# Patient Record
Sex: FEMALE | Race: WHITE | NOT HISPANIC OR LATINO | Employment: UNEMPLOYED | URBAN - METROPOLITAN AREA
[De-identification: names, ages, dates, MRNs, and addresses within clinical notes are randomized per-mention and may not be internally consistent; named-entity substitution may affect disease eponyms.]

---

## 2018-03-07 ENCOUNTER — OFFICE VISIT (OUTPATIENT)
Dept: SURGERY | Facility: CLINIC | Age: 53
End: 2018-03-07
Payer: COMMERCIAL

## 2018-03-07 ENCOUNTER — HOSPITAL ENCOUNTER (OUTPATIENT)
Dept: RADIOLOGY | Facility: HOSPITAL | Age: 53
Discharge: HOME | End: 2018-03-07
Attending: SURGERY
Payer: COMMERCIAL

## 2018-03-07 VITALS
DIASTOLIC BLOOD PRESSURE: 65 MMHG | HEART RATE: 77 BPM | WEIGHT: 103.81 LBS | SYSTOLIC BLOOD PRESSURE: 141 MMHG | TEMPERATURE: 98 F

## 2018-03-07 DIAGNOSIS — K42.9 UMBILICAL HERNIA WITHOUT OBSTRUCTION AND WITHOUT GANGRENE: Primary | ICD-10-CM

## 2018-03-07 DIAGNOSIS — K42.9 UMBILICAL HERNIA WITHOUT OBSTRUCTION AND WITHOUT GANGRENE: ICD-10-CM

## 2018-03-07 PROCEDURE — 74177 CT ABD & PELVIS W/CONTRAST: CPT | Mod: TC

## 2018-03-07 PROCEDURE — 74177 CT ABD & PELVIS W/CONTRAST: CPT | Mod: 26,,, | Performed by: RADIOLOGY

## 2018-03-07 PROCEDURE — 25500020 PHARM REV CODE 255

## 2018-03-07 PROCEDURE — 99204 OFFICE O/P NEW MOD 45 MIN: CPT | Mod: S$GLB,,, | Performed by: SURGERY

## 2018-03-07 PROCEDURE — 99999 PR PBB SHADOW E&M-NEW PATIENT-LVL IV: CPT | Mod: PBBFAC,,, | Performed by: SURGERY

## 2018-03-07 RX ORDER — SUMATRIPTAN SUCCINATE 100 MG/1
TABLET ORAL
Refills: 7 | COMMUNITY
Start: 2018-02-12

## 2018-03-07 RX ORDER — SODIUM CHLORIDE 9 MG/ML
INJECTION, SOLUTION INTRAVENOUS
Status: DISPENSED
Start: 2018-03-07 | End: 2018-03-07

## 2018-03-07 RX ORDER — NORETHINDRONE ACETATE AND ETHINYL ESTRADIOL 1MG-20(24)
KIT ORAL
Refills: 11 | COMMUNITY
Start: 2018-02-11 | End: 2019-03-21 | Stop reason: ALTCHOICE

## 2018-03-07 RX ORDER — SODIUM CHLORIDE 9 MG/ML
INJECTION, SOLUTION INTRAVENOUS CONTINUOUS
Status: CANCELLED | OUTPATIENT
Start: 2018-03-07

## 2018-03-07 RX ADMIN — IOHEXOL 30 ML: 350 INJECTION, SOLUTION INTRAVENOUS at 11:03

## 2018-03-07 RX ADMIN — IOHEXOL 75 ML: 350 INJECTION, SOLUTION INTRAVENOUS at 11:03

## 2018-03-09 ENCOUNTER — PATIENT MESSAGE (OUTPATIENT)
Dept: SURGERY | Facility: HOSPITAL | Age: 53
End: 2018-03-09

## 2018-03-09 NOTE — PROGRESS NOTES
Subjective:       Patient ID: Angel Penaloza is a 52 y.o. female.    Chief Complaint: Consult (possible hernia)      HPI 53 yo female with complaint of pain at level of umbilicus. She can feel a mass there. First noted about a week ago. Says it might feel better following BMs. She denies any significant GI problems. Last colonoscopy revealed a benign polyp. Denies N/V. Denies fever or chills. She cannot reduce the bulge completely. Her father is a retired general surgeon and is concerned about possible other intra abdominal pathology.    History reviewed. No pertinent past medical history.  History reviewed. No pertinent surgical history.      Current Outpatient Prescriptions:     sumatriptan (IMITREX) 100 MG tablet, TK 1 T PO AT HA ONSET. NO MORE THAN 2 TS PER DAY, Disp: , Rfl: 7    BLISOVI 24 FE 1 mg-20 mcg (24)/75 mg (4) per tablet, TK 1 T PO ONCE PER DAY, Disp: , Rfl: 11    Review of patient's allergies indicates:   Allergen Reactions    Penicillins Rash     augementin         History reviewed. No pertinent family history.  Social History     Social History    Marital status:      Spouse name: N/A    Number of children: N/A    Years of education: N/A     Occupational History    Not on file.     Social History Main Topics    Smoking status: Never Smoker    Smokeless tobacco: Never Used    Alcohol use No    Drug use: No    Sexual activity: Yes     Other Topics Concern    Not on file     Social History Narrative    No narrative on file       Review of Systems   Constitutional: Negative for activity change, chills, fever and unexpected weight change.   HENT: Negative for congestion, sore throat, trouble swallowing and voice change.    Eyes: Negative for redness and visual disturbance.   Respiratory: Negative for cough, shortness of breath and wheezing.    Cardiovascular: Negative for chest pain and palpitations.   Gastrointestinal: Positive for abdominal pain. Negative for blood in stool,  nausea and vomiting.   Endocrine: Negative.    Genitourinary: Negative for dysuria, frequency and hematuria.   Musculoskeletal: Negative for arthralgias, back pain and neck pain.   Skin: Negative for rash and wound.   Allergic/Immunologic: Negative.    Neurological: Negative for dizziness, weakness and headaches.   Hematological: Negative for adenopathy.   Psychiatric/Behavioral: Negative for agitation and dysphoric mood. The patient is not nervous/anxious.      Objective:     Physical Exam   Constitutional: She is oriented to person, place, and time. She appears well-developed and well-nourished. No distress.   HENT:   Head: Normocephalic and atraumatic.   Mouth/Throat: Oropharynx is clear and moist. No oropharyngeal exudate.   Eyes: Conjunctivae and EOM are normal. Pupils are equal, round, and reactive to light. No scleral icterus.   Neck: Normal range of motion. No thyromegaly present.   Cardiovascular: Normal rate and regular rhythm.    No murmur heard.  Pulmonary/Chest: Effort normal and breath sounds normal. She has no wheezes. She has no rales.   Abdominal: Soft. Bowel sounds are normal. She exhibits no distension and no mass. There is tenderness. There is no rebound and no guarding. A hernia is present.   Partially reducible fat containing umbilical hernia. No surrounding abdominal tenderness. No rebound or guarding.   Musculoskeletal: Normal range of motion. She exhibits no edema.   Lymphadenopathy:     She has no cervical adenopathy.   Neurological: She is alert and oriented to person, place, and time. No cranial nerve deficit.   Skin: Skin is warm and dry. No rash noted. No erythema.   Psychiatric: She has a normal mood and affect. Her behavior is normal.     Assessment:     Encounter Diagnosis   Name Primary?    Umbilical hernia without obstruction and without gangrene Yes       Plan:      1. CT scan to evaluate for other intra abdominal pathology.  2. Plan umbilical hernia repair.  3. Risks and  benefits of the planned procedure were discussed at length with the patient.  Risks and benefits of not proceeding with the procedure were discussed as well. All questions were answered. The patient expressed clear understanding and would like to proceed with the procedure as discussed.

## 2018-03-13 ENCOUNTER — PATIENT MESSAGE (OUTPATIENT)
Dept: SURGERY | Facility: HOSPITAL | Age: 53
End: 2018-03-13

## 2018-03-14 ENCOUNTER — HOSPITAL ENCOUNTER (OUTPATIENT)
Dept: PREADMISSION TESTING | Facility: HOSPITAL | Age: 53
Discharge: HOME | End: 2018-03-14
Attending: SURGERY
Payer: COMMERCIAL

## 2018-03-14 VITALS — BODY MASS INDEX: 18.25 KG/M2 | HEIGHT: 63 IN | WEIGHT: 103 LBS

## 2018-03-14 DIAGNOSIS — Z01.818 PRE-OP EXAM: ICD-10-CM

## 2018-03-14 DIAGNOSIS — K42.9 UMBILICAL HERNIA WITHOUT OBSTRUCTION AND WITHOUT GANGRENE: Primary | ICD-10-CM

## 2018-03-14 LAB
ALBUMIN SERPL BCP-MCNC: 3.8 G/DL
ALP SERPL-CCNC: 40 U/L
ALT SERPL W/O P-5'-P-CCNC: 16 U/L
ANION GAP SERPL CALC-SCNC: 10 MMOL/L
AST SERPL-CCNC: 18 U/L
BASOPHILS # BLD AUTO: 0 K/UL
BASOPHILS NFR BLD: 0.5 %
BILIRUB SERPL-MCNC: 0.4 MG/DL
BUN SERPL-MCNC: 24 MG/DL
CALCIUM SERPL-MCNC: 9.9 MG/DL
CHLORIDE SERPL-SCNC: 104 MMOL/L
CO2 SERPL-SCNC: 25 MMOL/L
CREAT SERPL-MCNC: 0.9 MG/DL
DIFFERENTIAL METHOD: ABNORMAL
EOSINOPHIL # BLD AUTO: 0.1 K/UL
EOSINOPHIL NFR BLD: 2 %
ERYTHROCYTE [DISTWIDTH] IN BLOOD BY AUTOMATED COUNT: 12.2 %
EST. GFR  (AFRICAN AMERICAN): >60 ML/MIN/1.73 M^2
EST. GFR  (NON AFRICAN AMERICAN): >60 ML/MIN/1.73 M^2
GLUCOSE SERPL-MCNC: 115 MG/DL
HCT VFR BLD AUTO: 38.6 %
HGB BLD-MCNC: 13 G/DL
LYMPHOCYTES # BLD AUTO: 2.3 K/UL
LYMPHOCYTES NFR BLD: 32.8 %
MCH RBC QN AUTO: 32.9 PG
MCHC RBC AUTO-ENTMCNC: 33.7 G/DL
MCV RBC AUTO: 98 FL
MONOCYTES # BLD AUTO: 0.5 K/UL
MONOCYTES NFR BLD: 7 %
NEUTROPHILS # BLD AUTO: 4.1 K/UL
NEUTROPHILS NFR BLD: 57.7 %
PLATELET # BLD AUTO: 245 K/UL
PMV BLD AUTO: 8.5 FL
POTASSIUM SERPL-SCNC: 4 MMOL/L
PROT SERPL-MCNC: 7.3 G/DL
RBC # BLD AUTO: 3.95 M/UL
SODIUM SERPL-SCNC: 139 MMOL/L
WBC # BLD AUTO: 7.1 K/UL

## 2018-03-14 PROCEDURE — 80053 COMPREHEN METABOLIC PANEL: CPT

## 2018-03-14 PROCEDURE — 85025 COMPLETE CBC W/AUTO DIFF WBC: CPT

## 2018-03-14 PROCEDURE — 36415 COLL VENOUS BLD VENIPUNCTURE: CPT

## 2018-03-14 PROCEDURE — 93010 ELECTROCARDIOGRAM REPORT: CPT | Mod: ,,, | Performed by: INTERNAL MEDICINE

## 2018-03-14 PROCEDURE — 93005 ELECTROCARDIOGRAM TRACING: CPT

## 2018-03-14 PROCEDURE — 99900103 DSU ONLY-NO CHARGE-INITIAL HR (STAT)

## 2018-03-14 PROCEDURE — 99900104 DSU ONLY-NO CHARGE-EA ADD'L HR (STAT)

## 2018-03-15 ENCOUNTER — ANESTHESIA EVENT (OUTPATIENT)
Dept: SURGERY | Facility: HOSPITAL | Age: 53
End: 2018-03-15
Payer: COMMERCIAL

## 2018-03-16 ENCOUNTER — ANESTHESIA (OUTPATIENT)
Dept: SURGERY | Facility: HOSPITAL | Age: 53
End: 2018-03-16
Payer: COMMERCIAL

## 2018-03-16 ENCOUNTER — SURGERY (OUTPATIENT)
Age: 53
End: 2018-03-16

## 2018-03-16 ENCOUNTER — HOSPITAL ENCOUNTER (OUTPATIENT)
Facility: HOSPITAL | Age: 53
Discharge: HOME | End: 2018-03-16
Attending: SURGERY | Admitting: SURGERY
Payer: COMMERCIAL

## 2018-03-16 VITALS
WEIGHT: 105 LBS | BODY MASS INDEX: 19.32 KG/M2 | TEMPERATURE: 97 F | RESPIRATION RATE: 16 BRPM | HEART RATE: 64 BPM | OXYGEN SATURATION: 100 % | SYSTOLIC BLOOD PRESSURE: 123 MMHG | HEIGHT: 62 IN | DIASTOLIC BLOOD PRESSURE: 66 MMHG

## 2018-03-16 DIAGNOSIS — K42.9 UMBILICAL HERNIA WITHOUT OBSTRUCTION AND WITHOUT GANGRENE: Primary | ICD-10-CM

## 2018-03-16 LAB
B-HCG UR QL: NEGATIVE
CTP QC/QA: YES

## 2018-03-16 PROCEDURE — 63600175 PHARM REV CODE 636 W HCPCS: Performed by: NURSE ANESTHETIST, CERTIFIED REGISTERED

## 2018-03-16 PROCEDURE — 99900104 DSU ONLY-NO CHARGE-EA ADD'L HR (STAT): Performed by: SURGERY

## 2018-03-16 PROCEDURE — 25000003 PHARM REV CODE 250: Performed by: NURSE ANESTHETIST, CERTIFIED REGISTERED

## 2018-03-16 PROCEDURE — 81025 URINE PREGNANCY TEST: CPT | Performed by: ANESTHESIOLOGY

## 2018-03-16 PROCEDURE — 99900103 DSU ONLY-NO CHARGE-INITIAL HR (STAT): Performed by: SURGERY

## 2018-03-16 PROCEDURE — 71000033 HC RECOVERY, INTIAL HOUR: Performed by: SURGERY

## 2018-03-16 PROCEDURE — 36000706: Performed by: SURGERY

## 2018-03-16 PROCEDURE — D9220A PRA ANESTHESIA: Mod: ANES,,, | Performed by: ANESTHESIOLOGY

## 2018-03-16 PROCEDURE — 36000707: Performed by: SURGERY

## 2018-03-16 PROCEDURE — 25000003 PHARM REV CODE 250: Performed by: ANESTHESIOLOGY

## 2018-03-16 PROCEDURE — 37000009 HC ANESTHESIA EA ADD 15 MINS: Performed by: SURGERY

## 2018-03-16 PROCEDURE — S0077 INJECTION, CLINDAMYCIN PHOSP: HCPCS | Performed by: SURGERY

## 2018-03-16 PROCEDURE — 88305 TISSUE EXAM BY PATHOLOGIST: CPT | Performed by: PATHOLOGY

## 2018-03-16 PROCEDURE — 71000015 HC POSTOP RECOV 1ST HR: Performed by: SURGERY

## 2018-03-16 PROCEDURE — 37000008 HC ANESTHESIA 1ST 15 MINUTES: Performed by: SURGERY

## 2018-03-16 PROCEDURE — 25000003 PHARM REV CODE 250: Performed by: SURGERY

## 2018-03-16 PROCEDURE — 49585 PR REPAIR UMBILICAL HERN,5+Y/O,REDUC: CPT | Mod: ,,, | Performed by: SURGERY

## 2018-03-16 PROCEDURE — 71000016 HC POSTOP RECOV ADDL HR: Performed by: SURGERY

## 2018-03-16 PROCEDURE — D9220A PRA ANESTHESIA: Mod: CRNA,,, | Performed by: NURSE ANESTHETIST, CERTIFIED REGISTERED

## 2018-03-16 RX ORDER — CLINDAMYCIN PHOSPHATE 900 MG/50ML
900 INJECTION, SOLUTION INTRAVENOUS
Status: COMPLETED | OUTPATIENT
Start: 2018-03-16 | End: 2018-03-16

## 2018-03-16 RX ORDER — ROCURONIUM BROMIDE 10 MG/ML
INJECTION, SOLUTION INTRAVENOUS
Status: DISCONTINUED | OUTPATIENT
Start: 2018-03-16 | End: 2018-03-16

## 2018-03-16 RX ORDER — FENTANYL CITRATE 50 UG/ML
25 INJECTION, SOLUTION INTRAMUSCULAR; INTRAVENOUS EVERY 5 MIN PRN
Status: DISCONTINUED | OUTPATIENT
Start: 2018-03-16 | End: 2018-03-16 | Stop reason: HOSPADM

## 2018-03-16 RX ORDER — DEXAMETHASONE SODIUM PHOSPHATE 4 MG/ML
INJECTION, SOLUTION INTRA-ARTICULAR; INTRALESIONAL; INTRAMUSCULAR; INTRAVENOUS; SOFT TISSUE
Status: DISCONTINUED | OUTPATIENT
Start: 2018-03-16 | End: 2018-03-16

## 2018-03-16 RX ORDER — OXYCODONE HYDROCHLORIDE 5 MG/1
5 TABLET ORAL
Status: DISCONTINUED | OUTPATIENT
Start: 2018-03-16 | End: 2018-03-16 | Stop reason: HOSPADM

## 2018-03-16 RX ORDER — MIDAZOLAM HYDROCHLORIDE 1 MG/ML
INJECTION, SOLUTION INTRAMUSCULAR; INTRAVENOUS
Status: DISCONTINUED | OUTPATIENT
Start: 2018-03-16 | End: 2018-03-16

## 2018-03-16 RX ORDER — ONDANSETRON 2 MG/ML
INJECTION INTRAMUSCULAR; INTRAVENOUS
Status: DISCONTINUED | OUTPATIENT
Start: 2018-03-16 | End: 2018-03-16

## 2018-03-16 RX ORDER — LIDOCAINE HYDROCHLORIDE 10 MG/ML
5 INJECTION, SOLUTION EPIDURAL; INFILTRATION; INTRACAUDAL; PERINEURAL ONCE
Status: COMPLETED | OUTPATIENT
Start: 2018-03-16 | End: 2018-03-16

## 2018-03-16 RX ORDER — ONDANSETRON 2 MG/ML
4 INJECTION INTRAMUSCULAR; INTRAVENOUS ONCE AS NEEDED
Status: DISCONTINUED | OUTPATIENT
Start: 2018-03-16 | End: 2018-03-16 | Stop reason: HOSPADM

## 2018-03-16 RX ORDER — LIDOCAINE HCL/PF 100 MG/5ML
SYRINGE (ML) INTRAVENOUS
Status: DISCONTINUED | OUTPATIENT
Start: 2018-03-16 | End: 2018-03-16

## 2018-03-16 RX ORDER — BUPIVACAINE HYDROCHLORIDE AND EPINEPHRINE 5; 5 MG/ML; UG/ML
INJECTION, SOLUTION EPIDURAL; INTRACAUDAL; PERINEURAL
Status: DISCONTINUED | OUTPATIENT
Start: 2018-03-16 | End: 2018-03-16 | Stop reason: HOSPADM

## 2018-03-16 RX ORDER — OXYCODONE HYDROCHLORIDE 5 MG/1
5 TABLET ORAL ONCE AS NEEDED
Status: COMPLETED | OUTPATIENT
Start: 2018-03-16 | End: 2018-03-16

## 2018-03-16 RX ORDER — FENTANYL CITRATE 50 UG/ML
INJECTION, SOLUTION INTRAMUSCULAR; INTRAVENOUS
Status: DISCONTINUED | OUTPATIENT
Start: 2018-03-16 | End: 2018-03-16

## 2018-03-16 RX ORDER — KETOROLAC TROMETHAMINE 30 MG/ML
INJECTION, SOLUTION INTRAMUSCULAR; INTRAVENOUS
Status: DISCONTINUED | OUTPATIENT
Start: 2018-03-16 | End: 2018-03-16

## 2018-03-16 RX ORDER — HYDROCODONE BITARTRATE AND ACETAMINOPHEN 5; 325 MG/1; MG/1
1 TABLET ORAL EVERY 4 HOURS PRN
Status: DISCONTINUED | OUTPATIENT
Start: 2018-03-16 | End: 2018-03-16 | Stop reason: HOSPADM

## 2018-03-16 RX ORDER — HYDROCODONE BITARTRATE AND ACETAMINOPHEN 7.5; 325 MG/1; MG/1
1 TABLET ORAL EVERY 4 HOURS PRN
Qty: 25 TABLET | Refills: 0 | Status: SHIPPED | OUTPATIENT
Start: 2018-03-16 | End: 2018-03-26

## 2018-03-16 RX ORDER — ACETAMINOPHEN 10 MG/ML
INJECTION, SOLUTION INTRAVENOUS
Status: DISCONTINUED | OUTPATIENT
Start: 2018-03-16 | End: 2018-03-16

## 2018-03-16 RX ORDER — SODIUM CHLORIDE 0.9 % (FLUSH) 0.9 %
3 SYRINGE (ML) INJECTION
Status: DISCONTINUED | OUTPATIENT
Start: 2018-03-16 | End: 2018-03-16 | Stop reason: HOSPADM

## 2018-03-16 RX ORDER — NEOSTIGMINE METHYLSULFATE 1 MG/ML
INJECTION, SOLUTION INTRAVENOUS
Status: DISCONTINUED | OUTPATIENT
Start: 2018-03-16 | End: 2018-03-16

## 2018-03-16 RX ORDER — SODIUM CHLORIDE 9 MG/ML
INJECTION, SOLUTION INTRAVENOUS CONTINUOUS
Status: DISCONTINUED | OUTPATIENT
Start: 2018-03-16 | End: 2018-03-16 | Stop reason: HOSPADM

## 2018-03-16 RX ORDER — METOCLOPRAMIDE HYDROCHLORIDE 5 MG/ML
10 INJECTION INTRAMUSCULAR; INTRAVENOUS EVERY 10 MIN PRN
Status: DISCONTINUED | OUTPATIENT
Start: 2018-03-16 | End: 2018-03-16 | Stop reason: HOSPADM

## 2018-03-16 RX ORDER — GLYCOPYRROLATE 0.2 MG/ML
INJECTION INTRAMUSCULAR; INTRAVENOUS
Status: DISCONTINUED | OUTPATIENT
Start: 2018-03-16 | End: 2018-03-16

## 2018-03-16 RX ORDER — PROPOFOL 10 MG/ML
VIAL (ML) INTRAVENOUS
Status: DISCONTINUED | OUTPATIENT
Start: 2018-03-16 | End: 2018-03-16

## 2018-03-16 RX ADMIN — LIDOCAINE HYDROCHLORIDE 75 MG: 20 INJECTION, SOLUTION INTRAVENOUS at 12:03

## 2018-03-16 RX ADMIN — ROCURONIUM BROMIDE 30 MG: 10 INJECTION, SOLUTION INTRAVENOUS at 12:03

## 2018-03-16 RX ADMIN — OXYCODONE HYDROCHLORIDE 5 MG: 5 TABLET ORAL at 01:03

## 2018-03-16 RX ADMIN — ACETAMINOPHEN 1000 MG: 10 INJECTION, SOLUTION INTRAVENOUS at 12:03

## 2018-03-16 RX ADMIN — DEXAMETHASONE SODIUM PHOSPHATE 8 MG: 4 INJECTION, SOLUTION INTRAMUSCULAR; INTRAVENOUS at 12:03

## 2018-03-16 RX ADMIN — CLINDAMYCIN PHOSPHATE 900 MG: 18 INJECTION, SOLUTION INTRAVENOUS at 12:03

## 2018-03-16 RX ADMIN — FENTANYL CITRATE 50 MCG: 50 INJECTION, SOLUTION INTRAMUSCULAR; INTRAVENOUS at 12:03

## 2018-03-16 RX ADMIN — GLYCOPYRROLATE 0.4 MG: 0.2 INJECTION, SOLUTION INTRAMUSCULAR; INTRAVENOUS at 12:03

## 2018-03-16 RX ADMIN — PROPOFOL 160 MG: 10 INJECTION, EMULSION INTRAVENOUS at 12:03

## 2018-03-16 RX ADMIN — BUPIVACAINE HYDROCHLORIDE AND EPINEPHRINE BITARTRATE 30 ML: 5; .0091 INJECTION, SOLUTION EPIDURAL; INTRACAUDAL; PERINEURAL at 12:03

## 2018-03-16 RX ADMIN — KETOROLAC TROMETHAMINE 30 MG: 30 INJECTION, SOLUTION INTRAMUSCULAR; INTRAVENOUS at 12:03

## 2018-03-16 RX ADMIN — GLYCOPYRROLATE 0.2 MG: 0.2 INJECTION, SOLUTION INTRAMUSCULAR; INTRAVENOUS at 12:03

## 2018-03-16 RX ADMIN — LIDOCAINE HYDROCHLORIDE 5 MG: 10 INJECTION, SOLUTION EPIDURAL; INFILTRATION; INTRACAUDAL; PERINEURAL at 10:03

## 2018-03-16 RX ADMIN — SODIUM CHLORIDE, SODIUM GLUCONATE, SODIUM ACETATE, POTASSIUM CHLORIDE, MAGNESIUM CHLORIDE, SODIUM PHOSPHATE, DIBASIC, AND POTASSIUM PHOSPHATE: .53; .5; .37; .037; .03; .012; .00082 INJECTION, SOLUTION INTRAVENOUS at 10:03

## 2018-03-16 RX ADMIN — NEOSTIGMINE METHYLSULFATE 5 MG: 1 INJECTION INTRAVENOUS at 12:03

## 2018-03-16 RX ADMIN — ONDANSETRON 4 MG: 2 INJECTION, SOLUTION INTRAMUSCULAR; INTRAVENOUS at 12:03

## 2018-03-16 RX ADMIN — MIDAZOLAM 2 MG: 1 INJECTION INTRAMUSCULAR; INTRAVENOUS at 12:03

## 2018-03-16 NOTE — H&P (VIEW-ONLY)
Subjective:       Patient ID: Angel Penaloza is a 52 y.o. female.    Chief Complaint: Consult (possible hernia)      HPI 51 yo female with complaint of pain at level of umbilicus. She can feel a mass there. First noted about a week ago. Says it might feel better following BMs. She denies any significant GI problems. Last colonoscopy revealed a benign polyp. Denies N/V. Denies fever or chills. She cannot reduce the bulge completely. Her father is a retired general surgeon and is concerned about possible other intra abdominal pathology.    History reviewed. No pertinent past medical history.  History reviewed. No pertinent surgical history.      Current Outpatient Prescriptions:     sumatriptan (IMITREX) 100 MG tablet, TK 1 T PO AT HA ONSET. NO MORE THAN 2 TS PER DAY, Disp: , Rfl: 7    BLISOVI 24 FE 1 mg-20 mcg (24)/75 mg (4) per tablet, TK 1 T PO ONCE PER DAY, Disp: , Rfl: 11    Review of patient's allergies indicates:   Allergen Reactions    Penicillins Rash     augementin         History reviewed. No pertinent family history.  Social History     Social History    Marital status:      Spouse name: N/A    Number of children: N/A    Years of education: N/A     Occupational History    Not on file.     Social History Main Topics    Smoking status: Never Smoker    Smokeless tobacco: Never Used    Alcohol use No    Drug use: No    Sexual activity: Yes     Other Topics Concern    Not on file     Social History Narrative    No narrative on file       Review of Systems   Constitutional: Negative for activity change, chills, fever and unexpected weight change.   HENT: Negative for congestion, sore throat, trouble swallowing and voice change.    Eyes: Negative for redness and visual disturbance.   Respiratory: Negative for cough, shortness of breath and wheezing.    Cardiovascular: Negative for chest pain and palpitations.   Gastrointestinal: Positive for abdominal pain. Negative for blood in stool,  nausea and vomiting.   Endocrine: Negative.    Genitourinary: Negative for dysuria, frequency and hematuria.   Musculoskeletal: Negative for arthralgias, back pain and neck pain.   Skin: Negative for rash and wound.   Allergic/Immunologic: Negative.    Neurological: Negative for dizziness, weakness and headaches.   Hematological: Negative for adenopathy.   Psychiatric/Behavioral: Negative for agitation and dysphoric mood. The patient is not nervous/anxious.      Objective:     Physical Exam   Constitutional: She is oriented to person, place, and time. She appears well-developed and well-nourished. No distress.   HENT:   Head: Normocephalic and atraumatic.   Mouth/Throat: Oropharynx is clear and moist. No oropharyngeal exudate.   Eyes: Conjunctivae and EOM are normal. Pupils are equal, round, and reactive to light. No scleral icterus.   Neck: Normal range of motion. No thyromegaly present.   Cardiovascular: Normal rate and regular rhythm.    No murmur heard.  Pulmonary/Chest: Effort normal and breath sounds normal. She has no wheezes. She has no rales.   Abdominal: Soft. Bowel sounds are normal. She exhibits no distension and no mass. There is tenderness. There is no rebound and no guarding. A hernia is present.   Partially reducible fat containing umbilical hernia. No surrounding abdominal tenderness. No rebound or guarding.   Musculoskeletal: Normal range of motion. She exhibits no edema.   Lymphadenopathy:     She has no cervical adenopathy.   Neurological: She is alert and oriented to person, place, and time. No cranial nerve deficit.   Skin: Skin is warm and dry. No rash noted. No erythema.   Psychiatric: She has a normal mood and affect. Her behavior is normal.     Assessment:     Encounter Diagnosis   Name Primary?    Umbilical hernia without obstruction and without gangrene Yes       Plan:      1. CT scan to evaluate for other intra abdominal pathology.  2. Plan umbilical hernia repair.  3. Risks and  benefits of the planned procedure were discussed at length with the patient.  Risks and benefits of not proceeding with the procedure were discussed as well. All questions were answered. The patient expressed clear understanding and would like to proceed with the procedure as discussed.

## 2018-03-16 NOTE — PLAN OF CARE
Pt rates pain to abdomen = 4 on scale to 1 to 10.  UPT = Negative.  VSS.  Updated Dr. Red on patient's post op N/V.

## 2018-03-16 NOTE — OP NOTE
Patient: Angel Penaloza     Date of Procedure: 3/16/2018    Procedure: Umbilical hernia repair without mesh implantation    Surgeon: Patrick Chapman MD    Assistant: Janina Putnam    Pre-op Diagnosis: Umbilical hernia without obstruction and without gangrene [K42.9]     Post-op Diagnosis: Umbilical hernia without obstruction and without gangrene [K42.9]    Findings: Umbilical hernia    Specimen: incarcerated fat    EBL: 10 cc    Complications: None      Procedure in detail:      After appropriate consent was obtained, consent forms signed and questions answered, the operative site was marked and the patient was taken to the operating room.  The patient was positioned and general anesthesia was induced. Pre-operative antibiotic was administered. Time out procedure was then performed with the members of the surgical team. The operative field was then prepped and draped in the usual sterile fashion. An ioban was placed.     An infra-umbilical skin incision was made and dissection was performed down to the fascial defect dissecting the umbilical skin off of the hernia sac. The fascial edges were cleared. A tiny amount of incarcerated fat was excised.    The defect was approximately 1 cm in diamter. The fascia was then reapproximated with 0 ethibond suture in an inturrupted fashion. There was minimal tension.      The subcutaneous tissues were then closed with a running 3-0 Vicryl suture tacking the umbilical skin down to the suture line. The skin was then closed with a running 4-0 Monocryl suture.     Steri-Strips and dressings were  applied.  The patient was then awakened, extubated, and transferred to the recovery room in stable condition.  The procedure was tolerated without complication.

## 2018-03-16 NOTE — ANESTHESIA POSTPROCEDURE EVALUATION
"Anesthesia Post Evaluation    Patient: Angel Penaloza    Procedure(s) Performed: Procedure(s) (LRB):  REPAIR-HERNIA-UMBILICAL (5 YRS +) (N/A)    Final Anesthesia Type: general  Patient location during evaluation: PACU  Patient participation: Yes- Able to Participate  Level of consciousness: awake and alert  Post-procedure vital signs: reviewed and stable  Pain management: adequate  Airway patency: patent  PONV status at discharge: No PONV  Anesthetic complications: no      Cardiovascular status: blood pressure returned to baseline  Respiratory status: unassisted  Hydration status: euvolemic  Follow-up not needed.        Visit Vitals  /68   Pulse 62   Temp 36.3 °C (97.3 °F) (Temporal)   Resp 16   Ht 5' 2" (1.575 m)   Wt 47.6 kg (105 lb)   LMP 02/14/2018   SpO2 100%   Breastfeeding? No   BMI 19.20 kg/m²       Pain/Nikhil Score: Pain Assessment Performed: Yes (3/16/2018  1:56 PM)  Presence of Pain: complains of pain/discomfort (3/16/2018  1:56 PM)  Pain Rating Prior to Med Admin: 3 (3/16/2018  1:24 PM)  Nikhil Score: 10 (3/16/2018  1:56 PM)      "

## 2018-03-16 NOTE — DISCHARGE INSTRUCTIONS
General Information:    1.  Do not drink alcoholic beverages including beer for 24 hours or as long as you are on pain medication..  2.  Do not drive a motor vehicle, operate machinery or power tools, or signs legal papers for 24 hours or as long as you are on pain medication.   3.  You may experience light-headedness, dizziness, and sleepiness following surgery. Please do not stay alone. A responsible adult should be with you for this 24 hour period.  4.  Go home and rest.    5. Progress slowly to a normal diet unless instructed.  Otherwise, begin with liquids such as soft drinks, then soup and crackers working up to solid foods. Drink plenty of nonalcoholic fluids.  6.  Certain anesthetics and pain medications produce nausea and vomiting in certain       individuals. If nausea becomes a problem at home, call you doctor.    7. A nurse will be calling you sometime after surgery. Do not be alarmed. This is our way of finding out how you are doing.    8. Several times every hour while you are awake, take 2-3 deep breaths and cough. If you had stomach surgery hold a pillow or rolled towel firmly against your stomach before you cough. This will help with any pain the cough might cause.  9. Several times every hour while you are awake, pump and flex your feet 5-6 times and do foot circles. This will help prevent blood clots.    10.Call your doctor for severe pain, bleeding, fever, or signs or symptoms of infection (pain, swelling, redness, foul odor, drainage).    Post op instructions for prevention of DVT  What is deep vein thrombosis?  Deep vein thrombosis (DVT) is the medical term for blood clots in the deep veins of the leg.  These blood clots can be dangerous.  A DVT can block a blood vessel and keep blood from getting where it needs to go.  Another problem is that the clot can travel to other parts of the body such as the lungs.  A clot that travels to the lungs is called a pulmonary embolus (PE) and can cause  serious problems with breathing which can lead to death.  Am I at risk for DVT/PE?  If you are not very active, you are at risk of DVT.  Anyone confined to bed, sitting for long periods of time, recovering from surgery, etc. increases the risk of DVT.  Other risk factors are cancer diagnosis, certain medications, estrogen replacement in any form,older age, obesity, pregnancy, smoking, history of clotting disorders, and dehydration.  How will I know if I have a DVT?   Swelling in the lower leg   Pain   Warmth, redness, hardness or bulging of the vein  If you have any of these symptoms, call your doctors office right away.  Some people will not have any symptoms until the clot moves to the lungs.  What are the symptoms of a PE?   Panting, shortness of breath, or trouble breathing   Sharp, knife-like chest pain when you breathe   Coughing or coughing up blood   Rapid heartbeat  If you have any of these symptoms or get worse quickly, call 911 for emergency treatment.  How can I prevent a DVT?   Avoid long periods of inactivity and dont cross your legs--get up and walk around every hour or so.   Stay active--walking after surgery is highly encouraged.  This means you should get out of the house and walk in the neighborhood.  Going up and down stairs will not impair healing (unless advised against such activity by your doctor).     Drink plenty of noncaffeinated, nonalcoholic fluids each day to prevent dehydration.   Wear special support stockings, if they have been advised by your doctor.   If you travel, stop at least once an hour and walk around.   Avoid smoking (assistance with stopping is available through your healthcare provider)  Always notify your doctor if you are not able to follow the post operative instructions that are given to you at the time of discharge.  It may be necessary to prescribe one of the medications available to prevent DVT.    Discharge Instructions: After Your  "Surgery/Procedure  Youve just had surgery. During surgery you were given medicine called anesthesia to keep you relaxed and free of pain. After surgery you may have some pain or nausea. This is common. Here are some tips for feeling better and getting well after surgery.     Stay on schedule with your medication.   Going home  Your doctor or nurse will show you how to take care of yourself when you go home. He or she will also answer your questions. Have an adult family member or friend drive you home.      For your safety we recommend these precaution for the first 24 hours after your procedure:  · Do not drive or use heavy equipment.  · Do not make important decisions or sign legal papers.  · Do not drink alcohol.  · Have someone stay with you, if needed. He or she can watch for problems and help keep you safe.  · Your concentration, balance, coordination, and judgement may be impaired for many hours after anesthesia.  Use caution when ambulating or standing up.     · You may feel weak and "washed out" after anesthesia and surgery.      Subtle residual effects of general anesthesia or sedation with regional / local anesthesia can last more than 24 hours.  Rest for the remainder of the day or longer if your Doctor/Surgeon has advised you to do so.  Although you may feel normal within the first 24 hours, your reflexes and mental ability may be impaired without you realizing it.  You may feel dizzy, lightheaded or sleepy for 24 hours or longer.      Be sure to go to all follow-up visits with your doctor. And rest after your surgery for as long as your doctor tells you to.  Coping with pain  If you have pain after surgery, pain medicine will help you feel better. Take it as told, before pain becomes severe. Also, ask your doctor or pharmacist about other ways to control pain. This might be with heat, ice, or relaxation. And follow any other instructions your surgeon or nurse gives you.    Tips for taking pain " medicine  To get the best relief possible, remember these points:  · Pain medicines can upset your stomach. Taking them with a little food may help.  · Most pain relievers taken by mouth need at least 20 to 30 minutes to start to work.  · Taking medicine on a schedule can help you remember to take it. Try to time your medicine so that you can take it before starting an activity. This might be before you get dressed, go for a walk, or sit down for dinner.  · Constipation is a common side effect of pain medicines. Call your doctor before taking any medicines such as laxatives or stool softeners to help ease constipation. Also ask if you should skip any foods. Drinking lots of fluids and eating foods such as fruits and vegetables that are high in fiber can also help. Remember, do not take laxatives unless your surgeon has prescribed them.  · Drinking alcohol and taking pain medicine can cause dizziness and slow your breathing. It can even be deadly. Do not drink alcohol while taking pain medicine.  · Pain medicine can make you react more slowly to things. Do not drive or run machinery while taking pain medicine.  Your health care provider may tell you to take acetaminophen to help ease your pain. Ask him or her how much you are supposed to take each day. Acetaminophen or other pain relievers may interact with your prescription medicines or other over-the-counter (OTC) drugs. Some prescription medicines have acetaminophen and other ingredients. Using both prescription and OTC acetaminophen for pain can cause you to overdose. Read the labels on your OTC medicines with care. This will help you to clearly know the list of ingredients, how much to take, and any warnings. It may also help you not take too much acetaminophen. If you have questions or do not understand the information, ask your pharmacist or health care provider to explain it to you before you take the OTC medicine.  Managing nausea  Some people have an upset  stomach after surgery. This is often because of anesthesia, pain, or pain medicine, or the stress of surgery. These tips will help you handle nausea and eat healthy foods as you get better. If you were on a special food plan before surgery, ask your doctor if you should follow it while you get better. These tips may help:  · Do not push yourself to eat. Your body will tell you when to eat and how much.  · Start off with clear liquids and soup. They are easier to digest.  · Next try semi-solid foods, such as mashed potatoes, applesauce, and gelatin, as you feel ready.  · Slowly move to solid foods. Dont eat fatty, rich, or spicy foods at first.  · Do not force yourself to have 3 large meals a day. Instead eat smaller amounts more often.  · Take pain medicines with a small amount of solid food, such as crackers or toast, to avoid nausea.     Call your surgeon if  · You still have pain an hour after taking medicine. The medicine may not be strong enough.  · You feel too sleepy, dizzy, or groggy. The medicine may be too strong.  · You have side effects like nausea, vomiting, or skin changes, such as rash, itching, or hives.       If you have obstructive sleep apnea  You were given anesthesia medicine during surgery to keep you comfortable and free of pain. After surgery, you may have more apnea spells because of this medicine and other medicines you were given. The spells may last longer than usual.   At home:  · Keep using the continuous positive airway pressure (CPAP) device when you sleep. Unless your health care provider tells you not to, use it when you sleep, day or night. CPAP is a common device used to treat obstructive sleep apnea.  · Talk with your provider before taking any pain medicine, muscle relaxants, or sedatives. Your provider will tell you about the possible dangers of taking these medicines.  © 3113-9911 [employer] [address] [place] [address], [place] [address]. All rights reserved. This  information is not intended as a substitute for professional medical care. Always follow your healthcare professional's instructions.    Using Opioids for Pain Management     Your doctor has given instructions for you to take an opioid.  This is a drug for bad pain.  It helps control pain without causing bleeding and kidney problems.  Common opioid names are morphine, hydromorphone, oxycodone, and methadone. These drugs are called narcotics.    There are several safety concerns you need to know.     · It is against the law to give or sell this drug to another person.  You must keep this medicine safely locked.    · You may have side effects from taking this medication.  These include nausea, itching, sweating, sleepiness, a change in your ability to breathe, and depression.  · Do not take alcohol or sleeping pills opioids.    · Long-term opoid use may no longer giver you relief from pain.  It can cause you stomach pain, mental anxiety, and headaches.  Long-term opoid use can potentially lead to unlawful street drug abuse and reduce your ability to stay employed.    · Your body may become opioid tolerant if you need to take more to get relief.    · You must stop taking opioids if you begin having more pain as a result of the medicine.    · Opioid withdrawal occurs when you have to stop taking the drug.  It can cause you to have nausea, vomiting, diarrhea, stomach pain, anxiety, and dilated pupils in your eyes. This condition means you are opioid dependent.    · Addiction is a drug induced brain disease. It means there are changes in how your brain is working.  Children, teens, and young adults under 25 years old are more likely to get addicted to opioids.      · Addiction can happen with repeated opioid use.  It does not happen with short-term use of two weeks or less.       For more information, please speak with your doctor or pharmacist  .    Discharge Instructions for Open Hernia Repair  You had a procedure  called open hernia repair. Also called a rupture, a hernia is a tear or weakness in the wall of the belly. This weakness may be present at birth. Or it can be caused by the wear and tear of daily living. Hernias may get worse with time or with physical stress. But surgery can help repair the weakness and eliminate symptoms.  Activity after surgery  Recommendations include the following:  · After surgery, take it easy for the rest of the day. If you had general anesthesia, dont use machinery or power tools, drink alcohol, or make any major decisions for at least the first 24 hours.  · Dont drive while you are still taking opioid pain medicine, and dont drive until you are able to step firmly on the brake pedal without hesitation.  · Ask others to help with chores and errands while you recover.  · Dont lift anything heavier than 10 pounds until your healthcare provider says its OK.  · Dont mow the lawn, use a vacuum , or do other strenuous activities until your healthcare provider says its OK.  · Walk as often as you feel able.  · Continue the coughing and deep breathing exercises that you learned in the hospital.  · Ask your healthcare provider when you can expect to return to work.  · Avoid constipation:  ¨ Eat fruits, vegetables, and whole grains.  ¨ Drink 6 to 8 glasses of water a day, unless otherwise instructed.  ¨ Use a laxative or a mild stool softener as instructed by your healthcare provider.  Bandage and incision care  Tips include the following:  · Do not get the bandage or wound wet for 48 hours.  · If strips of tape were used to close your incision, dont pull them off. Let them fall off on their own.  · Remove any gauze bandage in 48 hours.  · Wash your incision with mild soap and water. Pat it dry. Dont use oils, powders, or lotions on your incision. Do not soak your incision or take tub baths until cleared by your healthcare provider.  Follow-up care  Keep follow-up appointments during  your recovery. These allow your healthcare provider to check your progress and make sure youre healing well. You may also need to have your stitches, staples, or bandage removed. During office visits, tell your healthcare provider if you have any new symptoms. And be sure to ask any questions you have.     When to call your healthcare provider  Call your healthcare provider immediately if you have any of the following:  · A large amount of swelling or bruising (some testicular swelling and bruising is common)  · Bleeding  · Increasing pain  · Increased redness or drainage of the incision  · Fever of 100.4°F (38.0°C) or higher, or as directed by your healthcare provider  · Trouble urinating  · Nausea or vomiting   Date Last Reviewed: 7/1/2016  © 2491-3794 [employer] [facility], [employer]. [address] [place] [address], [place] [address]. All rights reserved. This information is not intended as a substitute for professional medical care. Always follow your healthcare professional's instructions.

## 2018-03-16 NOTE — DISCHARGE SUMMARY
Discharge Summary  General Surgery      Admit Date: 3/16/2018    Discharge Date :3/16/2018    Attending Physician: Patrick Rg     Discharge Physician: Patrick Rg    Discharged Condition: good    Discharge Diagnosis: Umbilical hernia without obstruction and without gangrene [K42.9]    Treatments/Procedures: Procedure(s) (LRB):  REPAIR-HERNIA-UMBILICAL (5 YRS +) (N/A)    Hospital Course: Uneventful; Discharged home from Recovery    Significant Diagnostic Studies: none    Disposition: Home or Self Care    Diet: Regular    Follow up: Office 10-14 days    Activity: No heavy lifting till seen in office.    Patient Instructions:   Current Discharge Medication List      START taking these medications    Details   hydrocodone-acetaminophen 7.5-325mg (NORCO) 7.5-325 mg per tablet Take 1 tablet by mouth every 4 (four) hours as needed for Pain.  Qty: 25 tablet, Refills: 0         CONTINUE these medications which have NOT CHANGED    Details   BLISOVI 24 FE 1 mg-20 mcg (24)/75 mg (4) per tablet TK 1 T PO ONCE PER DAY  Refills: 11      sumatriptan (IMITREX) 100 MG tablet TK 1 T PO AT HA ONSET. NO MORE THAN 2 TS PER DAY  Refills: 7      multivitamin capsule Take 1 capsule by mouth once daily.               Discharge Procedure Orders  Diet general     Activity as tolerated     Call MD for:  temperature >100.4     Call MD for:  persistent nausea and vomiting     Call MD for:  severe uncontrolled pain     Remove dressing in 48 hours

## 2018-03-16 NOTE — ANESTHESIA PREPROCEDURE EVALUATION
03/16/2018  Angel Penaloza is a 52 y.o., female.    Anesthesia Evaluation    I have reviewed the Patient Summary Reports.    I have reviewed the Nursing Notes.   I have reviewed the Medications.     Review of Systems  Anesthesia Hx:  Hx of Anesthetic complications PONV with oral surgery    Social:  Non-Smoker, Social Alcohol Use    Hematology/Oncology:  Hematology Normal   Oncology Normal     EENT/Dental:EENT/Dental Normal   Cardiovascular:  Cardiovascular Normal     Pulmonary:  Pulmonary Normal    Renal/:  Renal/ Normal     Hepatic/GI:   Umbilical hernia repair    Musculoskeletal:  Musculoskeletal Normal    Neurological:  Neurology Normal    Endocrine:  Endocrine Normal    Dermatological:  Skin Normal    Psych:  Psychiatric Normal           Physical Exam  General:  Well nourished    Airway/Jaw/Neck:  Airway Findings: Mouth Opening: Normal Tongue: Normal  General Airway Assessment: Adult  Mallampati: II  TM Distance: Normal, at least 6 cm        Eyes/Ears/Nose:  EYES/EARS/NOSE FINDINGS: Normal   Dental:  DENTAL FINDINGS: Normal   Chest/Lungs:  Chest/Lungs Findings: Clear to auscultation, Normal Respiratory Rate     Heart/Vascular:  Heart Findings: Rate: Normal  Rhythm: Regular Rhythm  Sounds: Normal        Mental Status:  Mental Status Findings:  Cooperative, Alert and Oriented         Anesthesia Plan  Type of Anesthesia, risks & benefits discussed:  Anesthesia Type:  general  Patient's Preference:   Intra-op Monitoring Plan: standard ASA monitors  Intra-op Monitoring Plan Comments:   Post Op Pain Control Plan: IV/PO Opioids PRN  Post Op Pain Control Plan Comments:   Induction:   IV  Beta Blocker:  Patient is not currently on a Beta-Blocker (No further documentation required).       Informed Consent: Patient understands risks and agrees with Anesthesia plan.  Questions answered. Anesthesia consent  signed with patient.  ASA Score: 1     Day of Surgery Review of History & Physical: I have interviewed and examined the patient. I have reviewed the patient's H&P dated:  There are no significant changes.  H&P update referred to the surgeon.         Ready For Surgery From Anesthesia Perspective.

## 2018-03-16 NOTE — TRANSFER OF CARE
"Anesthesia Transfer of Care Note    Patient: Angel Penaloza    Procedure(s) Performed: Procedure(s) (LRB):  REPAIR-HERNIA-UMBILICAL (5 YRS +) (N/A)    Patient location: PACU    Anesthesia Type: general    Transport from OR: Transported from OR on 2-3 L/min O2 by NC with adequate spontaneous ventilation    Post pain: adequate analgesia    Post assessment: no apparent anesthetic complications and tolerated procedure well    Post vital signs: stable    Level of consciousness: sedated    Nausea/Vomiting: no nausea/vomiting    Complications: none    Transfer of care protocol was followed      Last vitals:   Visit Vitals  BP (!) 97/51 (BP Location: Right arm, Patient Position: Sitting)   Pulse 75   Temp 36.7 °C (98.1 °F) (Skin)   Resp 14   Ht 5' 2" (1.575 m)   Wt 47.6 kg (105 lb)   LMP 02/14/2018   SpO2 97%   Breastfeeding? No   BMI 19.20 kg/m²     "

## 2018-03-28 ENCOUNTER — PATIENT MESSAGE (OUTPATIENT)
Dept: SURGERY | Facility: CLINIC | Age: 53
End: 2018-03-28

## 2018-03-28 ENCOUNTER — OFFICE VISIT (OUTPATIENT)
Dept: SURGERY | Facility: CLINIC | Age: 53
End: 2018-03-28
Payer: COMMERCIAL

## 2018-03-28 DIAGNOSIS — Z98.890 POST-OPERATIVE STATE: Primary | ICD-10-CM

## 2018-03-28 PROCEDURE — 99024 POSTOP FOLLOW-UP VISIT: CPT | Mod: S$GLB,,, | Performed by: SURGERY

## 2018-03-28 PROCEDURE — 99999 PR PBB SHADOW E&M-EST. PATIENT-LVL II: CPT | Mod: PBBFAC,,, | Performed by: SURGERY

## 2018-04-02 NOTE — PROGRESS NOTES
POST-OP NOTE    PROCEDURE: Umbilical hernia repair    COMPLAINTS: Started having some abdominal pain again yesterday but feeling a little better. Wants to get back to gym.    EXAM: Incision well approximated. No drainage. No infection.      IMPRESSION: Doing well    PLAN: FU as needed.

## 2018-08-27 ENCOUNTER — NURSE TRIAGE (OUTPATIENT)
Dept: ADMINISTRATIVE | Facility: CLINIC | Age: 53
End: 2018-08-27

## 2018-08-27 NOTE — TELEPHONE ENCOUNTER
Reason for Disposition   Chest pain lasting longer than 5 minutes and ANY of the following:* Over 50 years old* Over 30 years old and at least one cardiac risk factor (i.e., high blood pressure, diabetes, high cholesterol, obesity, smoker or strong family history of heart disease)* Pain is crushing, pressure-like, or heavy * Took nitroglycerin and chest pain was not relieved* History of heart disease (i.e., angina, heart attack, bypass surgery, angioplasty, CHF)    Protocols used:  CHEST PAIN-A-OH    Mrs. Penaloza states she is experiencing a heaviness in her chest that is constant. Patient states she does have a family history of heart disease, but she is very active. Patient advised to call 911. She states she does not think symptoms are emergent. Ms. Penaloza will follow up with her PCP.

## 2018-08-31 DIAGNOSIS — Z76.89 ESTABLISHING CARE WITH NEW DOCTOR, ENCOUNTER FOR: Primary | ICD-10-CM

## 2018-09-04 ENCOUNTER — OFFICE VISIT (OUTPATIENT)
Dept: CARDIOLOGY | Facility: CLINIC | Age: 53
End: 2018-09-04
Payer: COMMERCIAL

## 2018-09-04 VITALS
HEIGHT: 63 IN | HEART RATE: 80 BPM | WEIGHT: 103.81 LBS | OXYGEN SATURATION: 99 % | RESPIRATION RATE: 16 BRPM | SYSTOLIC BLOOD PRESSURE: 151 MMHG | DIASTOLIC BLOOD PRESSURE: 74 MMHG | BODY MASS INDEX: 18.39 KG/M2

## 2018-09-04 DIAGNOSIS — R07.9 CHEST PAIN, UNSPECIFIED TYPE: Primary | ICD-10-CM

## 2018-09-04 DIAGNOSIS — Z76.89 ESTABLISHING CARE WITH NEW DOCTOR, ENCOUNTER FOR: ICD-10-CM

## 2018-09-04 PROCEDURE — 3008F BODY MASS INDEX DOCD: CPT | Mod: CPTII,S$GLB,, | Performed by: INTERNAL MEDICINE

## 2018-09-04 PROCEDURE — 93000 ELECTROCARDIOGRAM COMPLETE: CPT | Mod: S$GLB,,, | Performed by: INTERNAL MEDICINE

## 2018-09-04 PROCEDURE — 99204 OFFICE O/P NEW MOD 45 MIN: CPT | Mod: S$GLB,,, | Performed by: INTERNAL MEDICINE

## 2018-09-04 PROCEDURE — 99999 PR PBB SHADOW E&M-EST. PATIENT-LVL III: CPT | Mod: PBBFAC,,, | Performed by: INTERNAL MEDICINE

## 2018-09-04 RX ORDER — THYROID 30 MG/1
30 TABLET ORAL
COMMUNITY
End: 2019-03-21

## 2018-09-04 NOTE — PROGRESS NOTES
[facility]    CC:  Chest pain  Chief Complaint   Patient presents with    [facility]     Chest discomfort       Patient ID: Angel Penaloza is a 53 y.o. female with  No significant cardiac past medical history of   HPI   she presents with episodes of sudden-onset chest pains and palpitation.  She was started on thyroid supplementation about few months back.  She could correlate her symptoms while she was started on these medications..    I do not have any details on her thyroid function test.    She is a very active and healthy individual.  She exercises on a regular basis.  Lately she has noticed that her heart rate shoots up as soon as she starts exercising.    Past Medical History:   Diagnosis Date    PONV (postoperative nausea and vomiting)      Past Surgical History:   Procedure Laterality Date    COLONOSCOPY      HERNIA REPAIR      MOUTH SURGERY       Social History     Socioeconomic History    Marital status:      Spouse name: Not on file    Number of children: Not on file    Years of education: Not on file    Highest education level: Not on file   Social Needs    Financial resource strain: Not on file    Food insecurity - worry: Not on file    Food insecurity - inability: Not on file    Transportation needs - medical: Not on file    Transportation needs - non-medical: Not on file   Occupational History    Not on file   Tobacco Use    Smoking status: Never Smoker    Smokeless tobacco: Never Used   Substance and Sexual Activity    Alcohol use: Yes     Comment: socially    Drug use: No    Sexual activity: Yes   Other Topics Concern    Not on file   Social History Narrative    Not on file     Family History   Problem Relation Age of Onset    Heart attack Father     Heart attack Maternal Grandmother     Heart attack Paternal Grandfather        Review of patient's allergies indicates:   Allergen Reactions    Penicillins Rash     augementin         Medication  "List with Changes/Refills   Current Medications    BLISOVI 24 FE 1 MG-20 MCG (24)/75 MG (4) PER TABLET    TK 1 T PO ONCE PER DAY    CHOLECALCIFEROL, VITAMIN D3, (VITAMIN D3) 4,000 UNIT CAP    Take by mouth once daily.    MULTIVITAMIN CAPSULE    Take 1 capsule by mouth once daily.    SUMATRIPTAN (IMITREX) 100 MG TABLET    TK 1 T PO AT HA ONSET. NO MORE THAN 2 TS PER DAY    THYROID, PORK, (NP THYROID) TAB    Take 30 mg by mouth before breakfast.       Review of Systems  Constitution: Denies chills, fever, and sweats.  HENT: Denies headaches or blurry vision.  Cardiovascular: chest pain   And palpitations.  Respiratory: Denies cough or shortness of breath.  Gastrointestinal: Denies abdominal pain, nausea, or vomiting.  Musculoskeletal: Denies muscle cramps.  Neurological: Denies dizziness or focal weakness.  Psychiatric/Behavioral: Normal mental status.  Hematologic/Lymphatic: Denies bleeding problem or easy bruising/bleeding.  Skin: Denies rash or suspicious lesions    Physical Examination  BP (!) 151/74 (BP Location: Right arm, Patient Position: Sitting)   Pulse 80   Resp 16   Ht 5' 3" (1.6 m)   Wt 47.1 kg (103 lb 13.4 oz)   SpO2 99%   BMI 18.39 kg/m²     Constitutional: No acute distress, conversant  HEENT: Sclera anicteric, Pupils equal, round and reactive to light, extraocular motions intact, Oropharynx clear  Neck: No JVD, no carotid bruits  Cardiovascular: regular rate and rhythm, no murmur, rubs or gallops, normal S1/S2  Pulmonary: Clear to auscultation bilaterally  Abdominal: Abdomen soft, nontender, nondistended, positive bowel sounds  Extremities: No lower extremity edema,   Pulses:  Carotid pulses are 2+ on the right side, and 2+ on the left side.  Radial pulses are 2+ on the right side, and 2+ on the left side.   .    Skin: No ecchymosis, erythema, or ulcers  Psych: Alert and oriented x 3, appropriate affect  Neuro: CNII-XII intact, no focal deficits    Labs:  Most Recent Data  CBC:   Lab Results "   Component Value Date    WBC 7.10 03/14/2018    HGB 13.0 03/14/2018    HCT 38.6 03/14/2018     03/14/2018    MCV 98 03/14/2018    RDW 12.2 03/14/2018     BMP:   Lab Results   Component Value Date     03/14/2018    K 4.0 03/14/2018     03/14/2018    CO2 25 03/14/2018    BUN 24 (H) 03/14/2018    CREATININE 0.9 03/14/2018     (H) 03/14/2018    CALCIUM 9.9 03/14/2018     LFTS;   Lab Results   Component Value Date    PROT 7.3 03/14/2018    ALBUMIN 3.8 03/14/2018    BILITOT 0.4 03/14/2018    AST 18 03/14/2018    ALKPHOS 40 (L) 03/14/2018    ALT 16 03/14/2018     COAGS: No results found for: INR, PROTIME, PTT  FLP: No results found for: CHOL, HDL, LDLCALC, TRIG, CHOLHDL  CARDIAC: No results found for: TROPONINI, CKMB, BNP    Imaging:    EKG:  Normal sinus rhythm.  No significant ST T wave changes.      I have personally reviewed these images and echo data    Assessment/Plan:  Angel was seen today for establish care.    Diagnoses and all orders for this visit:    Chest pain, unspecified type  -     Stress test with myocardial perfusion ([facility]UP[facility] ONLY[facility], [facility], [facility], [place], [place]); Future  -     Transthoracic echo (TTE) complete (CUPID ONLY-[facility], [facility], [facility], [place], [place]); Future    Establishing care with new doctor, encounter for  -     EKG 12-lead        Recommendations regarding healthy lifestyle, diet and exercise provided to the patient    I have referred her to Dr. Dillon  For further evaluation and management of her thyroid.        Manoj Chamberlain MD,MRCP,RPVI,FACC,FSCAI.  Interventional Cardiology   Phone [phone]

## 2018-09-05 ENCOUNTER — PATIENT MESSAGE (OUTPATIENT)
Dept: CARDIOLOGY | Facility: CLINIC | Age: 53
End: 2018-09-05

## 2018-09-10 ENCOUNTER — HOSPITAL ENCOUNTER (OUTPATIENT)
Dept: CARDIOLOGY | Facility: HOSPITAL | Age: 53
Discharge: HOME | End: 2018-09-10
Attending: INTERNAL MEDICINE
Payer: COMMERCIAL

## 2018-09-10 ENCOUNTER — HOSPITAL ENCOUNTER (OUTPATIENT)
Dept: RADIOLOGY | Facility: HOSPITAL | Age: 53
Discharge: HOME | End: 2018-09-10
Attending: INTERNAL MEDICINE
Payer: COMMERCIAL

## 2018-09-10 VITALS
HEIGHT: 63 IN | WEIGHT: 103 LBS | SYSTOLIC BLOOD PRESSURE: 149 MMHG | BODY MASS INDEX: 18.25 KG/M2 | DIASTOLIC BLOOD PRESSURE: 73 MMHG | HEART RATE: 83 BPM

## 2018-09-10 VITALS — HEART RATE: 80 BPM | SYSTOLIC BLOOD PRESSURE: 149 MMHG | RESPIRATION RATE: 16 BRPM | DIASTOLIC BLOOD PRESSURE: 73 MMHG

## 2018-09-10 DIAGNOSIS — R07.9 CHEST PAIN, UNSPECIFIED TYPE: ICD-10-CM

## 2018-09-10 LAB
CV STRESS BASE HR: 81
CVPEAKDIABP: 70
CVPEAKSYSBP: 169
CVRESTDIABP: 73
CVRESTSYSBP: 149
STRESS ECHO POST ESTIMATED WORKLOAD: 13.4 METS
STRESS ECHO POST EXERCISE DUR MIN: 11 MIN
STRESS ECHO POST EXERCISE DUR SEC: 56

## 2018-09-10 PROCEDURE — 93017 CV STRESS TEST TRACING ONLY: CPT

## 2018-09-10 PROCEDURE — 78452 HT MUSCLE IMAGE SPECT MULT: CPT

## 2018-09-10 PROCEDURE — 78452 HT MUSCLE IMAGE SPECT MULT: CPT | Mod: 26,,, | Performed by: INTERNAL MEDICINE

## 2018-09-10 PROCEDURE — 93018 CV STRESS TEST I&R ONLY: CPT | Mod: ,,, | Performed by: INTERNAL MEDICINE

## 2018-09-10 PROCEDURE — 93306 TTE W/DOPPLER COMPLETE: CPT | Mod: 26,,, | Performed by: INTERNAL MEDICINE

## 2018-09-10 PROCEDURE — 93016 CV STRESS TEST SUPVJ ONLY: CPT | Mod: ,,, | Performed by: INTERNAL MEDICINE

## 2018-09-10 PROCEDURE — 93306 TTE W/DOPPLER COMPLETE: CPT

## 2018-09-12 ENCOUNTER — OFFICE VISIT (OUTPATIENT)
Dept: ENDOCRINOLOGY | Facility: CLINIC | Age: 53
End: 2018-09-12
Payer: COMMERCIAL

## 2018-09-12 VITALS
RESPIRATION RATE: 16 BRPM | WEIGHT: 104.5 LBS | BODY MASS INDEX: 18.52 KG/M2 | TEMPERATURE: 98 F | HEIGHT: 63 IN | HEART RATE: 89 BPM | SYSTOLIC BLOOD PRESSURE: 145 MMHG | DIASTOLIC BLOOD PRESSURE: 85 MMHG

## 2018-09-12 DIAGNOSIS — E55.9 HYPOVITAMINOSIS D: ICD-10-CM

## 2018-09-12 DIAGNOSIS — N95.1 PERIMENOPAUSE: ICD-10-CM

## 2018-09-12 DIAGNOSIS — E06.9 THYROIDITIS: Primary | ICD-10-CM

## 2018-09-12 LAB
AORTIC ROOT ANNULUS: 1.91 CM
AORTIC VALVE CUSP SEPERATION: 1.79 CM
AV MEAN GRADIENT: 3.72 MMHG
AV PEAK GRADIENT: 7.99 MMHG
AV VALVE AREA: 4.1 CM2
BSA FOR ECHO PROCEDURE: 1.44 M2
CV ECHO LV RWT: 0.3 CM
DOP CALC AO PEAK VEL: 1.41 M/S
DOP CALC AO VTI: 24.1 CM
DOP CALC LVOT AREA: 3.17 CM2
DOP CALC LVOT DIAMETER: 2.01 CM
DOP CALC LVOT STROKE VOLUME: 98.92 CM3
DOP CALCLVOT PEAK VEL VTI: 31.19 CM
E WAVE DECELERATION TIME: 166.81 MSEC
E/A RATIO: 1.39
E/E' RATIO: 7.33
ECHO LV POSTERIOR WALL: 0.65 CM (ref 0.6–1.1)
FRACTIONAL SHORTENING: 26 % (ref 28–44)
INTERVENTRICULAR SEPTUM: 0.58 CM (ref 0.6–1.1)
IVRT: 0.09 MSEC
LEFT ATRIUM SIZE: 2.91 CM
LEFT INTERNAL DIMENSION IN SYSTOLE: 3.04 CM (ref 2.1–4)
LEFT VENTRICLE MASS INDEX: 48.1 G/M2
LEFT VENTRICULAR INTERNAL DIMENSION IN DIASTOLE: 4.1 CM (ref 3.5–6)
LEFT VENTRICULAR MASS: 69.24 G
LV LATERAL E/E' RATIO: 6.47
LV SEPTAL E/E' RATIO: 8.46
MV PEAK A VEL: 0.79 M/S
MV PEAK E VEL: 1.1 M/S
MV STENOSIS PRESSURE HALF TIME: 48.38 MS
MV VALVE AREA P 1/2 METHOD: 4.55 CM2
PISA TR MAX VEL: 2.25 M/S
PULM VEIN A" WAVE DURATION": 69 MSEC
PULM VEIN S/D RATIO: 0.9
PV PEAK D VEL: 0.67 M/S
PV PEAK GRADIENT: 4.31 MMHG
PV PEAK S VEL: 0.6 M/S
PV PEAK VELOCITY: 1.04 CM/S
RA PRESSURE: 3 MMHG
RIGHT VENTRICULAR END-DIASTOLIC DIMENSION: 3.17 CM
TDI LATERAL: 0.17
TDI SEPTAL: 0.13
TDI: 0.15
TR MAX PG: 20.25 MMHG
TRICUSPID ANNULAR PLANE SYSTOLIC EXCURSION: 0.03 CM
TV REST PULMONARY ARTERY PRESSURE: 23.25 MMHG

## 2018-09-12 PROCEDURE — 99204 OFFICE O/P NEW MOD 45 MIN: CPT | Mod: S$GLB,,, | Performed by: PHYSICIAN ASSISTANT

## 2018-09-12 PROCEDURE — 99999 PR PBB SHADOW E&M-EST. PATIENT-LVL III: CPT | Mod: PBBFAC,,, | Performed by: PHYSICIAN ASSISTANT

## 2018-09-12 PROCEDURE — 3008F BODY MASS INDEX DOCD: CPT | Mod: CPTII,S$GLB,, | Performed by: PHYSICIAN ASSISTANT

## 2018-09-12 NOTE — PROGRESS NOTES
CC: Hypothyroidism    HPI: Angel Penaloza is a 53 y.o. female here for hypothyroidism along with pending conditions listed in the Visit Diagnosis. +FHx of DM in her p. Grandmother. Her mother has thyroid disease. Her grandmother and grandfather  of heart attacks.     New to me and endocrine today.    Her OB put her on [place] thyroid 60 mg in May. Her TSH was 0.853, FT3 2.4, T4 7.4 Normal. Noticed palpitations while in spin class the following month. She had her thyroid hormones rechecked in July and her TSH was 0.018. Her dose was reduced to 30 mg. She is still having palpations and having dizziness. She has seen Dr. Chamberlain and had a normal stress test, EKG and echo.     + constipation, insomnia, palpitations,  anxiety    Weight stable, no heat/cold intolerance, tremors or diarrhea    She wakes up every night ~2:30 and cannot go back to sleep.She has vivid dreams, tosses and turns while sleeping. She does not know if she snores.     Her blood pressure has been elevated. However, in March, her BP was consistently~120/60.  Vitals with Age-Percentiles 2018 2018 9/10/2018 9/10/2018   Length cm 160 cm   160 cm   Height in 63 in   63 in   Systolic 154 151 149 149   Diastolic 81 74 73 73   Temp       Pulse 80  80 83   Respiration 16  16    Weight kg 47.1 kg   46.72 kg   Weight lbs 103 lb 13.4 oz   103 lb   VISIT REPORT       BMI (Calculated) 18.4 kg/m2   18.3 kg/m2   Pain Score 0        Vitals with Age-Percentiles 2018   Length cm 160 cm   Height in 63 in   Systolic 145   Diastolic 85   Temp 98.4   Pulse 89   Respiration 16   Weight kg 47.4 kg   Weight lbs 104 lb 8 oz   VISIT REPORT    BMI (Calculated) 18.5 kg/m2   Pain Score 4     PMHx, PSHx: reviewed in epic.    Social Hx: no ETOH/tobacco use.     She has light irregular periods and has one son. She is taking blisovi 24 1 mg-20 mcg. She is taking 5,000 IU of vitamin D daily.    ROS:   Constitutional: fatigue, wt stable  Eyes: No recent visual  "changes  Cardiovascular: + tightness and chest pain, palpitations  Respiratory: Denies current respiratory difficulty  Gastrointestinal: Denies recent bowel disturbances  GenitoUrinary - No dysuria  Skin: No new skin rash  Musc: sore muscles, no joint pain  Neurologic: No focal neurologic complaints  Endo: + polydipsia, polyphia  Remainder ROS negative     BP (!) 145/85 (BP Location: Right arm, Patient Position: Sitting, BP Method: Medium (Automatic))   Pulse 89   Temp 98.4 °F (36.9 °C) (Oral)   Resp 16   Ht 5' 3" (1.6 m)   Wt 47.4 kg (104 lb 8 oz)   BMI 18.51 kg/m²      Personally reviewed labs from [facility] obstetric[facility] G[facility]:   4/17/18  T4 7.2  TSH 0.853  FT3 2.4  Vitamin D 31.6  cmp wnl BUN 26  Cbc wnl  Testosterone 47    7/12/18  FT4 1.28  TSH 0.018 L  FT3 4.3    No results found for: TSH, N8BJPYW, L6MDTUW, THYROIDAB, FREET4       Chemistry        Component Value Date/Time     03/14/2018 1352    K 4.0 03/14/2018 1352     03/14/2018 1352    CO2 25 03/14/2018 1352    BUN 24 (H) 03/14/2018 1352    CREATININE 0.9 03/14/2018 1352     (H) 03/14/2018 1352        Component Value Date/Time    CALCIUM 9.9 03/14/2018 1352    ALKPHOS 40 (L) 03/14/2018 1352    AST 18 03/14/2018 1352    ALT 16 03/14/2018 1352    BILITOT 0.4 03/14/2018 1352    ESTGFRAFRICA >60 03/14/2018 1352    EGFRNONAA >60 03/14/2018 1352         No results found for: HGBA1C     PE:  GENERAL: middle aged thin female, well developed, well nourished  NECK: Supple neck, normal thyroid. No bruit. No AN.  LYMPHATIC: No cervical or supraclavicular lymphadenopathy  CARDIOVASCULAR: Normal heart sounds, no pedal edema  RESPIRATORY: Normal effort, clear to auscultation  ABDOMEN: soft, non-tender, non-distended.  FEET: appropriate footwear.   PSYCH: normal mood and affect.  NEURO: steady gait, CN ll-Xll grossly intact    Assessment/Plan:   1. Thyroiditis  TSH    T4, free    T3    Thyroglobulin    Thyroid peroxidase antibody    " Anti-thyroglobulin antibody   2. Hypovitaminosis D  Vitamin D   3. Perimenopause  Testosterone Panel    ESTROGENS, FRACTIONATED    Progesterone     Thyroiditis-stop armour thyroid. Repeat TFTs and abs in 5 weeks. Original TSH in April was normal before starting armour.  Hypovitaminosis D-check vitamin D. Continue 5,000 IU of vitamin D.  Perimenopause-recheck testosterone since it was elevated in 4/18 at 47.       F/u pending labs

## 2018-09-12 NOTE — LETTER
September 12, 2018      Patrick Rg MD  [address]  [address]  [place] [address]           [place] [facility]  [address]  [place] [address]  Phone: [phone]          Patient: Angel Penaloza   MR Number: [id number]   YOB: 1965   Date of Visit: 9/12/2018       Dear Dr. Patrick Rg:    Thank you for referring Angel Penaloza to me for evaluation. Attached you will find relevant portions of my assessment and plan of care.    If you have questions, please do not hesitate to call me. I look forward to following Angel Penaloza along with you.    Sincerely,    AMBER Foote PA-C    Enclosure  CC:  No Recipients    If you would like to receive this communication electronically, please contact [email or web address] or [phone] to request more information on [employer] access.    For providers and/or their staff who would like to refer a patient to [facility], please contact us through our one-stop-shop provider referral line, [facility], at [phone].    If you feel you have received this communication in error or would no longer like to receive these types of communications, please e-mail [email or web address]

## 2018-09-13 ENCOUNTER — PATIENT MESSAGE (OUTPATIENT)
Dept: CARDIOLOGY | Facility: CLINIC | Age: 53
End: 2018-09-13

## 2018-10-10 ENCOUNTER — LAB VISIT (OUTPATIENT)
Dept: LAB | Facility: HOSPITAL | Age: 53
End: 2018-10-10
Attending: PHYSICIAN ASSISTANT
Payer: COMMERCIAL

## 2018-10-10 DIAGNOSIS — N95.1 PERIMENOPAUSE: ICD-10-CM

## 2018-10-10 DIAGNOSIS — E55.9 HYPOVITAMINOSIS D: ICD-10-CM

## 2018-10-10 DIAGNOSIS — E06.9 THYROIDITIS: ICD-10-CM

## 2018-10-10 LAB
25(OH)D3+25(OH)D2 SERPL-MCNC: 76 NG/ML
PROGEST SERPL-MCNC: 0.1 NG/ML
T3 SERPL-MCNC: 85 NG/DL
T4 FREE SERPL-MCNC: 0.97 NG/DL
THYROGLOB AB SERPL IA-ACNC: 6.4 IU/ML
THYROPEROXIDASE IGG SERPL-ACNC: <6 IU/ML
TSH SERPL DL<=0.005 MIU/L-ACNC: 0.92 UIU/ML

## 2018-10-10 PROCEDURE — 82040 ASSAY OF SERUM ALBUMIN: CPT

## 2018-10-10 PROCEDURE — 86376 MICROSOMAL ANTIBODY EACH: CPT

## 2018-10-10 PROCEDURE — 86800 THYROGLOBULIN ANTIBODY: CPT

## 2018-10-10 PROCEDURE — 84270 ASSAY OF SEX HORMONE GLOBUL: CPT

## 2018-10-10 PROCEDURE — 84443 ASSAY THYROID STIM HORMONE: CPT

## 2018-10-10 PROCEDURE — 84439 ASSAY OF FREE THYROXINE: CPT

## 2018-10-10 PROCEDURE — 82679 ASSAY OF ESTRONE: CPT

## 2018-10-10 PROCEDURE — 36415 COLL VENOUS BLD VENIPUNCTURE: CPT | Mod: PO

## 2018-10-10 PROCEDURE — 84144 ASSAY OF PROGESTERONE: CPT

## 2018-10-10 PROCEDURE — 82306 VITAMIN D 25 HYDROXY: CPT

## 2018-10-10 PROCEDURE — 84480 ASSAY TRIIODOTHYRONINE (T3): CPT

## 2018-10-10 PROCEDURE — 86800 THYROGLOBULIN ANTIBODY: CPT | Mod: 91

## 2018-10-12 LAB
THRYOGLOBULIN INTERPRETATION: ABNORMAL
THYROGLOB AB SERPL-ACNC: <1.8 IU/ML
THYROGLOB SERPL-MCNC: 5.2 NG/ML

## 2018-10-14 LAB
ALBUMIN SERPL-MCNC: 4.4 G/DL (ref 3.6–5.1)
SHBG SERPL-SCNC: 207 NMOL/L (ref 17–124)
TESTOST FREE SERPL-MCNC: 0.1 PG/ML (ref 0.2–5)
TESTOST SERPL-MCNC: 6 NG/DL (ref 2–45)
TESTOSTERONE.FREE+WB SERPL-MCNC: 0.3 NG/DL (ref 0.5–8.5)

## 2018-10-15 LAB
ESTRADIOL SERPL-MCNC: <10 PG/ML
ESTRONE SERPL-MCNC: 12 PG/ML

## 2018-10-19 ENCOUNTER — PATIENT MESSAGE (OUTPATIENT)
Dept: ENDOCRINOLOGY | Facility: CLINIC | Age: 53
End: 2018-10-19

## 2019-03-21 ENCOUNTER — OFFICE VISIT (OUTPATIENT)
Dept: FAMILY MEDICINE | Facility: CLINIC | Age: 54
End: 2019-03-21
Payer: COMMERCIAL

## 2019-03-21 VITALS
OXYGEN SATURATION: 99 % | DIASTOLIC BLOOD PRESSURE: 64 MMHG | TEMPERATURE: 99 F | HEART RATE: 73 BPM | WEIGHT: 104.5 LBS | SYSTOLIC BLOOD PRESSURE: 118 MMHG | HEIGHT: 63 IN | BODY MASS INDEX: 18.52 KG/M2

## 2019-03-21 DIAGNOSIS — H92.01 OTALGIA OF RIGHT EAR: Primary | ICD-10-CM

## 2019-03-21 DIAGNOSIS — H65.03 BILATERAL ACUTE SEROUS OTITIS MEDIA, RECURRENCE NOT SPECIFIED: ICD-10-CM

## 2019-03-21 DIAGNOSIS — J01.00 ACUTE MAXILLARY SINUSITIS, RECURRENCE NOT SPECIFIED: ICD-10-CM

## 2019-03-21 PROCEDURE — 3008F BODY MASS INDEX DOCD: CPT | Mod: ,,, | Performed by: NURSE PRACTITIONER

## 2019-03-21 PROCEDURE — 3008F PR BODY MASS INDEX (BMI) DOCUMENTED: ICD-10-PCS | Mod: ,,, | Performed by: NURSE PRACTITIONER

## 2019-03-21 PROCEDURE — 99213 OFFICE O/P EST LOW 20 MIN: CPT | Mod: ,,, | Performed by: NURSE PRACTITIONER

## 2019-03-21 PROCEDURE — 99213 PR OFFICE/OUTPT VISIT, EST, LEVL III, 20-29 MIN: ICD-10-PCS | Mod: ,,, | Performed by: NURSE PRACTITIONER

## 2019-03-21 RX ORDER — FLUTICASONE PROPIONATE 50 MCG
1 SPRAY, SUSPENSION (ML) NASAL 2 TIMES DAILY
Qty: 16 G | Refills: 1 | Status: SHIPPED | OUTPATIENT
Start: 2019-03-21 | End: 2021-10-07 | Stop reason: SDUPTHER

## 2019-03-21 RX ORDER — DESOGESTREL AND ETHINYL ESTRADIOL AND ETHINYL ESTRADIOL 21-5 (28)
1 KIT ORAL DAILY
Refills: 6 | COMMUNITY
Start: 2019-03-13 | End: 2023-12-13

## 2019-03-21 RX ORDER — AZITHROMYCIN 250 MG/1
TABLET, FILM COATED ORAL
Qty: 6 TABLET | Refills: 0 | Status: SHIPPED | OUTPATIENT
Start: 2019-03-21 | End: 2019-03-26

## 2019-03-21 RX ORDER — AZELASTINE 1 MG/ML
1 SPRAY, METERED NASAL 2 TIMES DAILY
Qty: 30 ML | Refills: 1 | Status: SHIPPED | OUTPATIENT
Start: 2019-03-21 | End: 2023-12-13

## 2019-03-21 NOTE — PROGRESS NOTES
Subjective:       Patient ID: Angel Penaloza is a 53 y.o. female.    Chief Complaint: Sinus Problem and Headache    Sinus Problem   This is a new problem. The current episode started 1 to 4 weeks ago. The problem has been gradually worsening since onset. There has been no fever. The pain is moderate. Associated symptoms include congestion, ear pain, headaches, sinus pressure and sneezing. Pertinent negatives include no chills, coughing, diaphoresis, hoarse voice, neck pain, shortness of breath, sore throat or swollen glands. Past treatments include oral decongestants, saline sprays and sitting up. The treatment provided mild relief.   Otalgia    There is pain in the right ear. This is a new problem. The current episode started 1 to 4 weeks ago. The problem occurs constantly. The problem has been gradually worsening. There has been no fever. The pain is moderate. Associated symptoms include headaches and rhinorrhea. Pertinent negatives include no abdominal pain, coughing, diarrhea, ear discharge, hearing loss, neck pain, rash, sore throat or vomiting. She has tried NSAIDs for the symptoms. The treatment provided mild relief. There is no history of a chronic ear infection, hearing loss or a tympanostomy tube.     Review of Systems   Constitutional: Negative for activity change, appetite change, chills, diaphoresis and fever.   HENT: Positive for congestion, ear pain, postnasal drip, rhinorrhea, sinus pressure and sneezing. Negative for ear discharge, hearing loss, hoarse voice, sore throat, trouble swallowing and voice change.    Eyes: Negative for photophobia, pain, discharge and visual disturbance.   Respiratory: Negative for cough, chest tightness and shortness of breath.    Cardiovascular: Negative for chest pain and palpitations.   Gastrointestinal: Negative for abdominal pain, diarrhea, nausea and vomiting.   Endocrine: Negative for cold intolerance and heat intolerance.   Genitourinary: Negative for  difficulty urinating and dysuria.   Musculoskeletal: Negative for arthralgias, gait problem and neck pain.   Skin: Negative for rash.   Allergic/Immunologic: Negative for immunocompromised state.   Neurological: Positive for headaches. Negative for speech difficulty.   Psychiatric/Behavioral: Negative for confusion, self-injury and suicidal ideas.       Past Medical History:   Diagnosis Date    Insomnia, idiopathic     Migraines     PONV (postoperative nausea and vomiting)     Vitamin D deficiency       Past Surgical History:   Procedure Laterality Date    COLONOSCOPY      HERNIA REPAIR      March 2018    MOUTH SURGERY      Gum grafting 3x    REPAIR-HERNIA-UMBILICAL (5 YRS +) N/A 3/16/2018    Performed by Patrick Rg [facility] at [facility] OR       Family History   Problem Relation Age of Onset    Heart attack Father     Heart attack Maternal Grandmother     Heart attack Paternal Grandfather     Thyroid disease Mother     Diabetes Mellitus Paternal Grandmother        Social History     Socioeconomic History    Marital status:      Spouse name: None    Number of children: None    Years of education: None    Highest education level: None   Social Needs    Financial resource strain: None    Food insecurity - worry: None    Food insecurity - inability: None    Transportation needs - medical: None    Transportation needs - non-medical: None   Occupational History    None   Tobacco Use    Smoking status: Never Smoker    Smokeless tobacco: Never Used   Substance and Sexual Activity    Alcohol use: Yes     Comment: socially    Drug use: No    Sexual activity: Yes   Other Topics Concern    None   Social History Narrative    None       Current Outpatient Medications   Medication Sig Dispense Refill    cholecalciferol, vitamin D3, (VITAMIN D3) 4,000 unit Cap Take by mouth once daily.      multivitamin capsule Take 1 capsule by mouth once daily.      sumatriptan (IMITREX) 100 MG tablet TK 1 T  "PO AT HA ONSET. NO MORE THAN 2 TS PER DAY  7    VIORELE, 28, 0.15-0.02 mgx21 /0.01 mg x 5 per tablet Take 1 tablet by mouth once daily.  6    azelastine (ASTELIN) 137 mcg (0.1 %) nasal spray 1 spray (137 mcg total) by Nasal route 2 (two) times daily. 30 mL 1    azithromycin (Z-KENDAL) 250 MG tablet Take 2 tablets (500 mg total) by mouth once daily for 1 day, THEN 1 tablet (250 mg total) once daily for 4 days. 6 tablet 0    fluticasone (FLONASE) 50 mcg/actuation nasal spray 1 spray (50 mcg total) by Each Nare route 2 (two) times daily. 16 g 1     No current facility-administered medications for this visit.        Review of patient's allergies indicates:   Allergen Reactions    Penicillins Anaphylaxis and Rash     augementin       Objective:      Blood pressure 118/64, pulse 73, temperature 98.5 °F (36.9 °C), temperature source Oral, height 5' 3" (1.6 m), weight 47.4 kg (104 lb 8 oz), SpO2 99 %. Body mass index is 18.51 kg/m².   Physical Exam   Constitutional: She is oriented to person, place, and time. She appears well-developed. She is cooperative. No distress.   HENT:   Head: Normocephalic and atraumatic.   Right Ear: Tympanic membrane is bulging. Tympanic membrane is not injected. A middle ear effusion is present.   Left Ear: Tympanic membrane is not injected and not bulging. A middle ear effusion is present.   Nose: Mucosal edema present.   Mouth/Throat: Uvula is midline and mucous membranes are normal. Oropharyngeal exudate and posterior oropharyngeal erythema present.   Eyes: Conjunctivae, EOM and lids are normal. Pupils are equal, round, and reactive to light. Lids are everted and swept, no foreign bodies found. Right pupil is round and reactive. Left pupil is round and reactive.   Neck: Trachea normal and normal range of motion. Neck supple.   Cardiovascular: Normal rate, regular rhythm, S1 normal, S2 normal, normal heart sounds and intact distal pulses.   Pulmonary/Chest: Effort normal and breath sounds " normal. No respiratory distress. She has no decreased breath sounds. She has no wheezes.   Abdominal: Soft. Bowel sounds are normal. There is no rigidity and no guarding.   Musculoskeletal: Normal range of motion.   Lymphadenopathy:     She has no cervical adenopathy.     She has no axillary adenopathy.   Neurological: She is alert and oriented to person, place, and time.   Skin: Skin is warm and dry. Capillary refill takes less than 2 seconds.   Psychiatric: She has a normal mood and affect. Her behavior is normal. Judgment and thought content normal.   Nursing note and vitals reviewed.          Assessment:       1. Otalgia of right ear    2. Bilateral acute serous otitis media, recurrence not specified    3. Acute maxillary sinusitis, recurrence not specified        Plan:       Angel was seen today for sinus problem and headache.    Diagnoses and all orders for this visit:    Otalgia of right ear  -Ibuprofen and cold/heat packs prn.    Bilateral acute serous otitis media, recurrence not specified  -     fluticasone (FLONASE) 50 mcg/actuation nasal spray; 1 spray (50 mcg total) by Each Nare route 2 (two) times daily.    Acute maxillary sinusitis, recurrence not specified  -     fluticasone (FLONASE) 50 mcg/actuation nasal spray; 1 spray (50 mcg total) by Each Nare route 2 (two) times daily.  -     azelastine (ASTELIN) 137 mcg (0.1 %) nasal spray; 1 spray (137 mcg total) by Nasal route 2 (two) times daily.  -     azithromycin (Z-KENDAL) 250 MG tablet; Take 2 tablets (500 mg total) by mouth once daily for 1 day, THEN 1 tablet (250 mg total) once daily for 4 days.

## 2019-03-21 NOTE — PATIENT INSTRUCTIONS
When to Use Antibiotics   Antibiotics are medicines used to treat infections caused by bacteria. They dont work for illnesses caused by viruses or an allergic reaction. In fact, taking antibiotics for reasons other than a bacterial infection can cause problems. For example, you may have side effects from the medicine. And if you really need an antibiotic, it may not work well.                                                                                                                                              When antibiotics wont help  Your healthcare provider wont usually prescribe antibiotics for the following conditions. You can help by not asking for them if you have:   · A cold. This type of illness is caused by a virus. It can cause a runny nose, stuffed-up nose, sneezing, coughing, headache, mild body aches, and low fever. A cold gets better on its own in a few days to a week.  · The flu (influenza). This is a respiratory illness caused by a virus. The flu usually goes away on its own in a week or so. It can cause fever, body aches, sore throat, and fatigue.  · Bronchitis. This is an infection in the lungs most often caused by a virus. You may have coughing, phlegm, body aches, and a low fever. A common type of bronchitis is known as a chest cold (acute bronchitis). This often happens after you have a respiratory infection like a common cold. Bronchitis can take weeks to go away, but antibiotics usually dont help.  · Most sore throats. Sore throats are most often caused by viruses. Your throat may feel scratchy or achy, and it may hurt to swallow. You may also have a low fever and body aches. A sore throat usually gets better in a few days.  · Most ear infections. An ear infection may be caused by a virus or bacteria. It causes pain in the ear. Antibiotics usually dont help, and the infection goes away on its own.  · Most sinus infections (sinusitis). This kind of infection causes sinus pain and  swelling, and a runny nose. In most cases, sinusitis goes away on its own, and antibiotics dont make recovery quicker.  · Allergic rhinitis. This is a set of symptoms caused by an allergic reaction. You may have sneezing, a runny nose, itchy or watery eyes, or a sore throat. Allergies are not treated with antibiotics.  · Low fever. A mild fever thats less than 100.4°F (38°C) most likely doesnt need treatment with antibiotics.   When antibiotics can help   Antibiotics can be used to treat:                                                     · Strep throat. This is a throat infectioncaused by a certain type of bacteria. Symptoms of strep throat include a sore throat, white patches on the tonsils, red spots on the roof of the mouth, fever, body aches, and nausea and vomiting.  · Urinary tract infection (UTI). This is a bacterial infection of the bladder and the tube that takes urine out of the body. It can cause burning pain and urine thats cloudy or tinted with blood. UTIs are very common. Antibiotics usually help treat these infections.  · Some ear infections. In some cases, a healthcare provider may prescribe antibiotics for an ear infection. You may need a test to show whats causing the ear infection.  · Some sinus infections. In some cases, yourhealthcare provider may give you antibiotics. He or she may first need to make sure your symptoms arent caused by a virus, fungus, allergies, or air pollutants such as smoke.   Your doctor may also recommend antibiotics if you have a condition that can affect your immune system, such as diabetes or cancer.   Self-care at home   If your infection cant be treated with antibiotics, you can take other steps to feel better. Try the remedies below. In general:   · Rest and sleep as much as needed.  · Drink water and other clear fluids.  · Dont smoke, and avoid smoke from other people.  · Use over-the-counter medicine such as acetaminophen to ease pain or fever, as  directed by your healthcare provider.   To treat sinus pain or nasal congestion:   · Put a warm, moist washcloth on your face where you feel sinus pain or pressure.  · Use a nasal spray with medicine or saline, as directed by your healthcare provider.  · Breathe in steam from a hot shower.  · Use a humidifier or cool mist vaporizer.   To quiet a cough:   · Use a humidifier or cool mist vaporizer.  · Breathe in steam from a hot shower.  · Use cough lozenges.   To sooth a sore throat:   · Suck on ice chips, popsicles, or lozenges.  · Use a sore throat spray.  · Use a humidifier or cool mist vaporizer.  · Gargle with saltwater.  · Drink warm liquids.   To ease ear pain:   · Hold a warm, moist washcloth on the ear for 10 minutes at a time.  Date Last Reviewed: 9/1/2016  © 7955-9868 [employer]. [address] [place] [address]. All rights reserved. This information is not intended as a substitute for professional medical care. Always follow your healthcare professional's instructions.        Anatomy of the Ear    The ear is a complex and delicate organ. It collects sound waves so you can hear the world around you. The ear also has a second function--it helps you keep your balance. Your ear can be divided into 3 parts. The outer ear and middle ear help collect and amplify sound. The inner ear converts sound waves to messages that are sent to the brain. The inner ear also senses the movement and position of your head and body so you can maintain your balance and see clearly, even when you change positions.  The mastoid bone surrounds the middle ear. The external ear collects sound waves. The ear canal carries sound waves to the eardrum. The eardrum vibrates from sound waves, setting the middle ear bones in motion. The middle ear bones (ossicles) vibrate, transmitting sound waves to the inner ear. When the ear is healthy, air pressure remains balanced in the middle ear. The eustachian tube helps  control air pressure in the middle ear. The semicircular canals help maintain balance. The vestibular nerve carries balance signals to the brain. The auditory nerve carries sound signals to the brain. The cochlea picks up sound waves and makes nerve signals.     Date Last Reviewed: 10/1/2016  © 3545-6447 "[employer]. [address] [place] [address], [place] [address]. All rights reserved. This information is not intended as a substitute for professional medical care. Always follow your healthcare professional's instructions.

## 2020-04-06 DIAGNOSIS — Z12.31 ENCOUNTER FOR SCREENING MAMMOGRAM FOR MALIGNANT NEOPLASM OF BREAST: Primary | ICD-10-CM

## 2020-05-26 ENCOUNTER — HOSPITAL ENCOUNTER (OUTPATIENT)
Dept: RADIOLOGY | Facility: HOSPITAL | Age: 55
Discharge: HOME | End: 2020-05-26
Attending: OBSTETRICS & GYNECOLOGY
Payer: COMMERCIAL

## 2020-05-26 DIAGNOSIS — Z12.31 ENCOUNTER FOR SCREENING MAMMOGRAM FOR MALIGNANT NEOPLASM OF BREAST: ICD-10-CM

## 2020-05-26 PROCEDURE — 77067 SCR MAMMO BI INCL CAD: CPT | Mod: TC,PO

## 2020-08-08 ENCOUNTER — OFFICE VISIT (OUTPATIENT)
Dept: PRIMARY CARE CLINIC | Facility: CLINIC | Age: 55
End: 2020-08-08
Payer: COMMERCIAL

## 2020-08-08 VITALS
DIASTOLIC BLOOD PRESSURE: 64 MMHG | SYSTOLIC BLOOD PRESSURE: 142 MMHG | TEMPERATURE: 99 F | OXYGEN SATURATION: 100 % | RESPIRATION RATE: 18 BRPM | HEART RATE: 82 BPM

## 2020-08-08 DIAGNOSIS — R05.9 COUGH: ICD-10-CM

## 2020-08-08 DIAGNOSIS — Z20.822 SUSPECTED COVID-19 VIRUS INFECTION: Primary | ICD-10-CM

## 2020-08-08 PROCEDURE — 99203 OFFICE O/P NEW LOW 30 MIN: CPT | Mod: S$GLB,,, | Performed by: EMERGENCY MEDICINE

## 2020-08-08 PROCEDURE — 99203 PR OFFICE/OUTPT VISIT, NEW, LEVL III, 30-44 MIN: ICD-10-PCS | Mod: S$GLB,,, | Performed by: EMERGENCY MEDICINE

## 2020-08-08 PROCEDURE — U0003 INFECTIOUS AGENT DETECTION BY NUCLEIC ACID (DNA OR RNA); SEVERE ACUTE RESPIRATORY SYNDROME CORONAVIRUS 2 (SARS-COV-2) (CORONAVIRUS DISEASE [COVID-19]), AMPLIFIED PROBE TECHNIQUE, MAKING USE OF HIGH THROUGHPUT TECHNOLOGIES AS DESCRIBED BY CMS-2020-01-R: HCPCS

## 2020-08-08 NOTE — PROGRESS NOTES
Subjective:        Time seen by provider: 4:40 PM on 08/08/2020    Angel Penaloza is a 55 y.o. female with migraines who presents for an evaluation of possible COVID-19. She complains of a fever, sore throat, congestion, nausea, body aches, and HA. The patient states her symptoms began yesterday and has had no known positive exposure. She reports a Tmax of 100° that improved with taking tylenol. Her last dose was ~30 minutes ago. The patient has used a nasal spray with relief. She denies vomiting or any other symptoms at this time. No pertinent PSHx.     Review of Systems   Constitutional: Positive for fever. Negative for activity change, appetite change and fatigue.   HENT: Positive for congestion and sore throat. Negative for rhinorrhea.    Respiratory: Negative for cough, chest tightness, shortness of breath and wheezing.    Cardiovascular: Negative for chest pain and palpitations.   Gastrointestinal: Positive for nausea. Negative for diarrhea and vomiting.   Musculoskeletal: Positive for myalgias. Negative for arthralgias.   Skin: Negative for rash.   Neurological: Positive for headaches. Negative for weakness, light-headedness and numbness.       Objective:      Physical Exam  Vitals signs and nursing note reviewed.   Constitutional:       General: She is not in acute distress.     Appearance: She is well-developed. She is not diaphoretic.   HENT:      Head: Normocephalic and atraumatic.      Nose: Nose normal.   Eyes:      Conjunctiva/sclera: Conjunctivae normal.   Neck:      Musculoskeletal: Normal range of motion.   Cardiovascular:      Rate and Rhythm: Normal rate and regular rhythm.      Heart sounds: Normal heart sounds. No murmur.   Pulmonary:      Effort: No respiratory distress.      Breath sounds: Normal breath sounds. No wheezing.   Musculoskeletal: Normal range of motion.   Skin:     General: Skin is warm and dry.   Neurological:      Mental Status: She is alert and oriented to person, place, and  time.         Assessment and Plan:      Diagnoses and all orders for this visit:    Suspected Covid-19 Virus Infection  -     COVID-19 Routine Screening  - Discharge home and await results.   - Return to clinic or ED for new or worsening symptoms.   - Follow-up with PCP as needed.     Scribe Attestation:   I, Tarsha Delacruz, am scribing for, and in the presence of, Ivonne Goodwin PA-C. I performed the above scribed service and the documentation accurately describes the services I performed. I attest to the accuracy of the note.    I, Ivonne Goodwin PA-C, personally performed the services described in this documentation. All medical record entries made by the scribe were at my direction and in my presence.  I have reviewed the chart and agree that the record reflects my personal performance and is accurate and complete. Ivonne Goodwin PA-C.  5:30 PM 08/08/2020

## 2020-08-09 LAB — SARS-COV-2 RNA RESP QL NAA+PROBE: DETECTED

## 2020-08-10 DIAGNOSIS — U07.1 COVID-19 VIRUS DETECTED: ICD-10-CM

## 2020-08-14 ENCOUNTER — NURSE TRIAGE (OUTPATIENT)
Dept: ADMINISTRATIVE | Facility: CLINIC | Age: 55
End: 2020-08-14

## 2020-08-14 NOTE — TELEPHONE ENCOUNTER
Reason for Disposition   SEVERE or constant chest pain or pressure (Exception: mild central chest pain, present only when coughing)    Additional Information   Negative: SEVERE difficulty breathing (e.g., struggling for each breath, speaks in single words)   Negative: Difficult to awaken or acting confused (e.g., disoriented, slurred speech)   Negative: Bluish (or gray) lips or face now   Negative: Shock suspected (e.g., cold/pale/clammy skin, too weak to stand, low BP, rapid pulse)   Negative: Sounds like a life-threatening emergency to the triager   Negative: [1] COVID-19 exposure AND [2] NO symptoms   Negative: COVID-19 and Breastfeeding, questions about   Negative: [1] Adult with possible COVID-19 symptoms AND [2] triager concerned about severity of symptoms or other causes    Protocols used: CORONAVIRUS (COVID-19) DIAGNOSED OR [id number]  Spouse called re covid test done last sat. Pt received positive test results on Monday. sx T101.5-101.9. T99 now    Pass 2-3 days weakness, admits to pain with deep breath, HA wont go away. 'Fuzzy headed'. Office recommended ED/UC. rec ED today. Spouse agrees. call back with questions. skin blotchy on legs.

## 2021-05-10 ENCOUNTER — PATIENT MESSAGE (OUTPATIENT)
Dept: RESEARCH | Facility: HOSPITAL | Age: 56
End: 2021-05-10

## 2021-05-25 DIAGNOSIS — Z12.31 ENCOUNTER FOR SCREENING MAMMOGRAM FOR MALIGNANT NEOPLASM OF BREAST: Primary | ICD-10-CM

## 2021-05-28 ENCOUNTER — HOSPITAL ENCOUNTER (OUTPATIENT)
Dept: RADIOLOGY | Facility: HOSPITAL | Age: 56
Discharge: HOME | End: 2021-05-28
Attending: OBSTETRICS & GYNECOLOGY
Payer: COMMERCIAL

## 2021-05-28 DIAGNOSIS — Z12.31 ENCOUNTER FOR SCREENING MAMMOGRAM FOR MALIGNANT NEOPLASM OF BREAST: ICD-10-CM

## 2021-05-28 PROCEDURE — 77067 SCR MAMMO BI INCL CAD: CPT | Mod: TC,PO

## 2021-10-07 ENCOUNTER — OFFICE VISIT (OUTPATIENT)
Dept: FAMILY MEDICINE | Facility: CLINIC | Age: 56
End: 2021-10-07
Payer: COMMERCIAL

## 2021-10-07 VITALS
TEMPERATURE: 99 F | HEIGHT: 63 IN | BODY MASS INDEX: 18.78 KG/M2 | OXYGEN SATURATION: 99 % | SYSTOLIC BLOOD PRESSURE: 130 MMHG | WEIGHT: 106 LBS | HEART RATE: 73 BPM | DIASTOLIC BLOOD PRESSURE: 70 MMHG

## 2021-10-07 DIAGNOSIS — J01.00 ACUTE MAXILLARY SINUSITIS, RECURRENCE NOT SPECIFIED: ICD-10-CM

## 2021-10-07 DIAGNOSIS — M54.50 ACUTE BILATERAL LOW BACK PAIN WITHOUT SCIATICA: ICD-10-CM

## 2021-10-07 DIAGNOSIS — H65.03 BILATERAL ACUTE SEROUS OTITIS MEDIA, RECURRENCE NOT SPECIFIED: ICD-10-CM

## 2021-10-07 DIAGNOSIS — M62.838 MUSCLE SPASM: Primary | ICD-10-CM

## 2021-10-07 PROCEDURE — 1160F PR REVIEW ALL MEDS BY PRESCRIBER/CLIN PHARMACIST DOCUMENTED: ICD-10-PCS | Mod: S$GLB,,, | Performed by: NURSE PRACTITIONER

## 2021-10-07 PROCEDURE — 96372 THER/PROPH/DIAG INJ SC/IM: CPT | Mod: S$GLB,,, | Performed by: NURSE PRACTITIONER

## 2021-10-07 PROCEDURE — 1160F RVW MEDS BY RX/DR IN RCRD: CPT | Mod: S$GLB,,, | Performed by: NURSE PRACTITIONER

## 2021-10-07 PROCEDURE — 96372 PR INJECTION,THERAP/PROPH/DIAG2ST, IM OR SUBCUT: ICD-10-PCS | Mod: S$GLB,,, | Performed by: NURSE PRACTITIONER

## 2021-10-07 PROCEDURE — 3008F BODY MASS INDEX DOCD: CPT | Mod: S$GLB,,, | Performed by: NURSE PRACTITIONER

## 2021-10-07 PROCEDURE — 99214 PR OFFICE/OUTPT VISIT, EST, LEVL IV, 30-39 MIN: ICD-10-PCS | Mod: 25,S$GLB,, | Performed by: NURSE PRACTITIONER

## 2021-10-07 PROCEDURE — 99214 OFFICE O/P EST MOD 30 MIN: CPT | Mod: 25,S$GLB,, | Performed by: NURSE PRACTITIONER

## 2021-10-07 PROCEDURE — 3008F PR BODY MASS INDEX (BMI) DOCUMENTED: ICD-10-PCS | Mod: S$GLB,,, | Performed by: NURSE PRACTITIONER

## 2021-10-07 RX ORDER — VALACYCLOVIR HYDROCHLORIDE 1 G/1
TABLET, FILM COATED ORAL
COMMUNITY
Start: 2021-09-07 | End: 2024-02-15 | Stop reason: SDUPTHER

## 2021-10-07 RX ORDER — FLUTICASONE PROPIONATE 50 MCG
1 SPRAY, SUSPENSION (ML) NASAL 2 TIMES DAILY
Qty: 16 G | Refills: 1 | Status: SHIPPED | OUTPATIENT
Start: 2021-10-07 | End: 2024-02-15 | Stop reason: SDUPTHER

## 2021-10-07 RX ORDER — CYCLOBENZAPRINE HCL 10 MG
10 TABLET ORAL NIGHTLY PRN
Qty: 30 TABLET | Refills: 0 | Status: SHIPPED | OUTPATIENT
Start: 2021-10-07 | End: 2021-10-17

## 2021-10-07 RX ORDER — KETOROLAC TROMETHAMINE 30 MG/ML
60 INJECTION, SOLUTION INTRAMUSCULAR; INTRAVENOUS
Status: COMPLETED | OUTPATIENT
Start: 2021-10-07 | End: 2021-10-07

## 2021-10-07 RX ADMIN — KETOROLAC TROMETHAMINE 60 MG: 30 INJECTION, SOLUTION INTRAMUSCULAR; INTRAVENOUS at 05:10

## 2022-05-19 DIAGNOSIS — Z12.31 ENCOUNTER FOR SCREENING MAMMOGRAM FOR MALIGNANT NEOPLASM OF BREAST: Primary | ICD-10-CM

## 2022-06-09 ENCOUNTER — HOSPITAL ENCOUNTER (OUTPATIENT)
Dept: RADIOLOGY | Facility: HOSPITAL | Age: 57
Discharge: HOME | End: 2022-06-09
Attending: OBSTETRICS & GYNECOLOGY
Payer: COMMERCIAL

## 2022-06-09 DIAGNOSIS — Z12.31 ENCOUNTER FOR SCREENING MAMMOGRAM FOR MALIGNANT NEOPLASM OF BREAST: ICD-10-CM

## 2022-06-09 PROCEDURE — 77063 BREAST TOMOSYNTHESIS BI: CPT | Mod: TC,PO

## 2022-10-20 DIAGNOSIS — J32.4 CHRONIC PANSINUSITIS: Primary | ICD-10-CM

## 2022-10-24 ENCOUNTER — HOSPITAL ENCOUNTER (OUTPATIENT)
Dept: RADIOLOGY | Facility: HOSPITAL | Age: 57
Discharge: HOME | End: 2022-10-24
Attending: OTOLARYNGOLOGY
Payer: COMMERCIAL

## 2022-10-24 DIAGNOSIS — J32.4 CHRONIC PANSINUSITIS: ICD-10-CM

## 2022-10-24 PROCEDURE — 70486 CT MAXILLOFACIAL W/O DYE: CPT | Mod: TC,PO

## 2023-05-25 DIAGNOSIS — Z12.31 OTHER SCREENING MAMMOGRAM: Primary | ICD-10-CM

## 2023-06-13 ENCOUNTER — HOSPITAL ENCOUNTER (OUTPATIENT)
Dept: RADIOLOGY | Facility: HOSPITAL | Age: 58
Discharge: HOME | End: 2023-06-13
Attending: OBSTETRICS & GYNECOLOGY
Payer: COMMERCIAL

## 2023-06-13 DIAGNOSIS — Z12.31 OTHER SCREENING MAMMOGRAM: ICD-10-CM

## 2023-06-13 PROCEDURE — 77067 SCR MAMMO BI INCL CAD: CPT | Mod: TC,PO

## 2023-09-07 ENCOUNTER — TELEPHONE (OUTPATIENT)
Dept: DERMATOLOGY | Facility: CLINIC | Age: 58
End: 2023-09-07
Payer: COMMERCIAL

## 2023-09-07 ENCOUNTER — PATIENT MESSAGE (OUTPATIENT)
Dept: DERMATOLOGY | Facility: CLINIC | Age: 58
End: 2023-09-07
Payer: COMMERCIAL

## 2023-09-07 NOTE — TELEPHONE ENCOUNTER
Next new patient appointment not available until January.  Patient would like to be seen within the month.  A list of alternate providers provided via portal

## 2023-09-07 NOTE — TELEPHONE ENCOUNTER
----- Message from Edwige Longoria sent at 9/7/2023 12:39 PM CDT -----  Contact: Self    Type:  Sooner Appointment Request    Caller is requesting a sooner appointment.  Caller declined first available appointment listed below.  Caller will not accept being placed on the waitlist and is requesting a message be sent to doctor.    Name of Caller:  Pt  When is the first available appointment?  NA  Symptoms:  Growth on back of arm that is becoming painful  Best Call Back Number:  [phone]    Additional Information:

## 2023-09-10 ENCOUNTER — PATIENT MESSAGE (OUTPATIENT)
Dept: FAMILY MEDICINE | Facility: CLINIC | Age: 58
End: 2023-09-10

## 2023-12-13 ENCOUNTER — OFFICE VISIT (OUTPATIENT)
Dept: FAMILY MEDICINE | Facility: CLINIC | Age: 58
End: 2023-12-13
Payer: COMMERCIAL

## 2023-12-13 VITALS
DIASTOLIC BLOOD PRESSURE: 76 MMHG | BODY MASS INDEX: 18.07 KG/M2 | TEMPERATURE: 99 F | OXYGEN SATURATION: 99 % | WEIGHT: 102 LBS | HEIGHT: 63 IN | SYSTOLIC BLOOD PRESSURE: 142 MMHG | HEART RATE: 76 BPM

## 2023-12-13 DIAGNOSIS — R39.15 URINARY URGENCY: Primary | ICD-10-CM

## 2023-12-13 DIAGNOSIS — Z13.220 LIPID SCREENING: ICD-10-CM

## 2023-12-13 DIAGNOSIS — R51.9 ACUTE NONINTRACTABLE HEADACHE, UNSPECIFIED HEADACHE TYPE: ICD-10-CM

## 2023-12-13 DIAGNOSIS — R03.0 ELEVATED BLOOD PRESSURE READING: ICD-10-CM

## 2023-12-13 LAB
BILIRUB UR QL STRIP: NEGATIVE
CLARITY UR: CLEAR
COLOR UR: NORMAL
GLUCOSE UR QL STRIP: NEGATIVE
KETONES UR QL STRIP: NEGATIVE
LEUKOCYTE ESTERASE UR QL STRIP: NEGATIVE
PH, POC UA: 5.5 (ref 5–8.5)
POC BLOOD, URINE: NEGATIVE
POC NITRATES, URINE: NEGATIVE
PROT UR QL STRIP: NEGATIVE
SP GR UR STRIP: 1 (ref 1–1.03)
UROBILINOGEN UR STRIP-ACNC: NORMAL (ref 0.2–8)

## 2023-12-13 PROCEDURE — 99214 PR OFFICE/OUTPT VISIT, EST, LEVL IV, 30-39 MIN: ICD-10-PCS | Mod: S$GLB,,, | Performed by: NURSE PRACTITIONER

## 2023-12-13 PROCEDURE — 3008F PR BODY MASS INDEX (BMI) DOCUMENTED: ICD-10-PCS | Mod: CPTII,S$GLB,, | Performed by: NURSE PRACTITIONER

## 2023-12-13 PROCEDURE — 3077F SYST BP >= 140 MM HG: CPT | Mod: CPTII,S$GLB,, | Performed by: NURSE PRACTITIONER

## 2023-12-13 PROCEDURE — 3078F PR MOST RECENT DIASTOLIC BLOOD PRESSURE < 80 MM HG: ICD-10-PCS | Mod: CPTII,S$GLB,, | Performed by: NURSE PRACTITIONER

## 2023-12-13 PROCEDURE — 1159F PR MEDICATION LIST DOCUMENTED IN MEDICAL RECORD: ICD-10-PCS | Mod: CPTII,S$GLB,, | Performed by: NURSE PRACTITIONER

## 2023-12-13 PROCEDURE — 3077F PR MOST RECENT SYSTOLIC BLOOD PRESSURE >= 140 MM HG: ICD-10-PCS | Mod: CPTII,S$GLB,, | Performed by: NURSE PRACTITIONER

## 2023-12-13 PROCEDURE — 3008F BODY MASS INDEX DOCD: CPT | Mod: CPTII,S$GLB,, | Performed by: NURSE PRACTITIONER

## 2023-12-13 PROCEDURE — 1159F MED LIST DOCD IN RCRD: CPT | Mod: CPTII,S$GLB,, | Performed by: NURSE PRACTITIONER

## 2023-12-13 PROCEDURE — 1160F PR REVIEW ALL MEDS BY PRESCRIBER/CLIN PHARMACIST DOCUMENTED: ICD-10-PCS | Mod: CPTII,S$GLB,, | Performed by: NURSE PRACTITIONER

## 2023-12-13 PROCEDURE — 81003 URINALYSIS AUTO W/O SCOPE: CPT | Mod: QW,S$GLB,, | Performed by: NURSE PRACTITIONER

## 2023-12-13 PROCEDURE — 81003 POCT URINALYSIS, DIPSTICK, AUTOMATED, W/O SCOPE: ICD-10-PCS | Mod: QW,S$GLB,, | Performed by: NURSE PRACTITIONER

## 2023-12-13 PROCEDURE — 3078F DIAST BP <80 MM HG: CPT | Mod: CPTII,S$GLB,, | Performed by: NURSE PRACTITIONER

## 2023-12-13 PROCEDURE — 1160F RVW MEDS BY RX/DR IN RCRD: CPT | Mod: CPTII,S$GLB,, | Performed by: NURSE PRACTITIONER

## 2023-12-13 PROCEDURE — 99214 OFFICE O/P EST MOD 30 MIN: CPT | Mod: S$GLB,,, | Performed by: NURSE PRACTITIONER

## 2023-12-13 RX ORDER — PROGESTERONE 200 MG/1
CAPSULE ORAL
COMMUNITY

## 2023-12-13 RX ORDER — ESTRADIOL 1 MG/1
TABLET ORAL
COMMUNITY

## 2023-12-13 RX ORDER — ASCORBIC ACID 125 MG
TABLET,CHEWABLE ORAL
COMMUNITY

## 2023-12-13 NOTE — PROGRESS NOTES
Subjective:       Patient ID: Angel Penaloza is a 58 y.o. female.    Chief Complaint: Polyuria, always thirsty, and Headache    Patient presents today for evaluation of symptoms.  Patient is complaining of urinary urgency, excessive thirst, headache, and elevated blood pressure.  Patient has not had any of these problems in the past.  She was recently started on progesterone and estrogen by her gynecologist due to menstrual bleeding.  She was previously taking birth control and having regular menstrual cycles.  Patient went to her gynecologist to seek out help with cessation of menstruation.  Her medications have stopped her.  But she has had side effects she feels possibly since starting the medications.  She has headaches daily that are dull and mild.    She is concerned that she urinates very frequently.  Patient states that she has voided about 15 times today.  It is 2:20 p.m. she states that she drinks about 64 oz of water throughout the day and eats very healthy.  She is unsure of a family history of diabetes but believes that her grandmother had diabetes but was also obese.    She states that she has a history of being started on thyroid medication for hormone therapy but after visiting Endocrinology, discovered that she did not need the medication.  She has no true history of hypothyroidism or thyroid problem that she knows of.      Review of Systems   Constitutional:  Positive for activity change. Negative for appetite change and fever.   HENT:  Negative for congestion, ear discharge, ear pain, sore throat, trouble swallowing and voice change.    Eyes:  Negative for photophobia, pain, discharge and visual disturbance.   Respiratory:  Positive for chest tightness. Negative for cough, shortness of breath and wheezing.    Cardiovascular:  Negative for chest pain and palpitations.   Gastrointestinal:  Negative for abdominal pain, nausea and vomiting.   Endocrine: Negative for cold intolerance and heat  intolerance.   Genitourinary:  Positive for urgency. Negative for difficulty urinating and dysuria.   Musculoskeletal:  Negative for arthralgias and gait problem.   Skin:  Negative for rash.   Allergic/Immunologic: Negative for immunocompromised state.   Neurological:  Negative for speech difficulty and headaches.   Psychiatric/Behavioral:  Negative for confusion, self-injury and suicidal ideas.        Past Medical History:   Diagnosis Date    Insomnia, idiopathic     Migraines     PONV (postoperative nausea and vomiting)     Vitamin D deficiency       Past Surgical History:   Procedure Laterality Date    COLONOSCOPY      HERNIA REPAIR      March 2018    MOUTH SURGERY      Gum grafting 3x       Family History   Problem Relation Age of Onset    Heart attack Father     Heart attack Maternal Grandmother     Heart attack Paternal Grandfather     Thyroid disease Mother     Diabetes Mellitus Paternal Grandmother     Breast cancer Paternal Grandmother 75       Social History     Socioeconomic History    Marital status:    Tobacco Use    Smoking status: Never    Smokeless tobacco: Never   Substance and Sexual Activity    Alcohol use: Yes     Comment: socially    Drug use: No    Sexual activity: Yes     Social Determinants of Health     Financial Resource Strain: Low Risk  (12/12/2023)    Overall Financial Resource Strain (CARDIA)     Difficulty of Paying Living Expenses: Not hard at all   Food Insecurity: No Food Insecurity (12/12/2023)    Hunger Vital Sign     Worried About Running Out of Food in the Last Year: Never true     Ran Out of Food in the Last Year: Never true   Transportation Needs: No Transportation Needs (12/12/2023)    PRAPARE - Transportation     Lack of Transportation (Medical): No     Lack of Transportation (Non-Medical): No   Physical Activity: Sufficiently Active (12/12/2023)    Exercise Vital Sign     Days of Exercise per Week: 6 days     Minutes of Exercise per Session: 40 min   Stress: No  "Stress Concern Present (12/12/2023)    [employer] [place] [employer] - Occupational Stress Questionnaire     Feeling of Stress : Only a little   Social Connections: Unknown (12/12/2023)    Social Connection and Isolation Panel [NHANES]     Frequency of Communication with Friends and Family: More than three times a week     Frequency of Social Gatherings with Friends and Family: More than three times a week     Active Member of Clubs or Organizations: Yes     Attends Club or Organization Meetings: More than 4 times per year     Marital Status:    Housing Stability: Low Risk  (12/12/2023)    Housing Stability Vital Sign     Unable to Pay for Housing in the Last Year: No     Number of Places Lived in the Last Year: 1     Unstable Housing in the Last Year: No       Current Outpatient Medications   Medication Sig Dispense Refill    cholecalciferol, vitamin D3, (VITAMIN D3) 25 mcg (1,000 unit) Chew Take by mouth.      estradioL (ESTRACE) 1 MG tablet       fluticasone propionate (FLONASE) 50 mcg/actuation nasal spray 1 spray (50 mcg total) by Each Nostril route 2 (two) times daily. 16 g 1    multivitamin capsule Take 1 capsule by mouth once daily.      progesterone (PROMETRIUM) 200 MG capsule       sumatriptan (IMITREX) 100 MG tablet TK 1 T PO AT HA ONSET. NO MORE THAN 2 TS PER DAY  7    valACYclovir (VALTREX) 1000 MG tablet        No current facility-administered medications for this visit.       Review of patient's allergies indicates:   Allergen Reactions    Penicillins Anaphylaxis and Rash     augementin       Objective:      Blood pressure (!) 142/76, pulse 76, temperature 98.9 °F (37.2 °C), height 5' 3" (1.6 m), weight 46.3 kg (102 lb), last menstrual period 07/24/2023, SpO2 99 %. Body mass index is 18.07 kg/m².   Physical Exam  Vitals and nursing note reviewed.   Constitutional:       General: She is not in acute distress.     Appearance: Normal appearance. She is well-developed.   HENT:      Head: " Normocephalic and atraumatic.      Right Ear: External ear normal.      Left Ear: External ear normal.      Nose: Nose normal.      Mouth/Throat:      Mouth: Mucous membranes are moist.   Eyes:      General: Lids are normal. Lids are everted, no foreign bodies appreciated.      Conjunctiva/sclera: Conjunctivae normal.      Pupils: Pupils are equal, round, and reactive to light.      Right eye: Pupil is round and reactive.      Left eye: Pupil is round and reactive.   Neck:      Trachea: Trachea normal.   Cardiovascular:      Rate and Rhythm: Normal rate and regular rhythm.      Pulses: Normal pulses.      Heart sounds: Normal heart sounds, S1 normal and S2 normal.   Pulmonary:      Effort: Pulmonary effort is normal.      Breath sounds: Normal breath sounds.   Abdominal:      General: Abdomen is flat. Bowel sounds are normal.      Palpations: Abdomen is soft. Abdomen is not rigid.      Tenderness: There is no guarding.   Musculoskeletal:         General: Normal range of motion.      Cervical back: Normal range of motion and neck supple. No muscular tenderness.   Lymphadenopathy:      Cervical: No cervical adenopathy.   Skin:     General: Skin is warm and dry.      Capillary Refill: Capillary refill takes less than 2 seconds.   Neurological:      General: No focal deficit present.      Mental Status: She is alert and oriented to person, place, and time.   Psychiatric:         Mood and Affect: Mood is anxious. Affect is labile.         Speech: Speech is rapid and pressured.         Behavior: Behavior normal. Behavior is cooperative.         Thought Content: Thought content normal.         Judgment: Judgment normal.             Assessment:       1. Urinary urgency    2. Elevated blood pressure reading    3. Acute nonintractable headache, unspecified headache type    4. Lipid screening        Plan:       Angel was seen today for polyuria, always thirsty and headache.    Diagnoses and all orders for this  visit:    Urinary urgency  -     POCT Urinalysis, Dipstick, Automated, W/O Scope  -     Urine culture    Elevated blood pressure reading  -     CBC Auto Differential; Future  -     Comprehensive Metabolic Panel; Future  -     T4, Free; Future  -     T3, Free; Future  -     CBC Auto Differential  -     Comprehensive Metabolic Panel  -     T4, Free  -     T3, Free    Acute nonintractable headache, unspecified headache type  -     CBC Auto Differential; Future  -     Comprehensive Metabolic Panel; Future  -     TSH; Future  -     CBC Auto Differential  -     Comprehensive Metabolic Panel  -     TSH    Lipid screening  -     Lipid Panel; Future  -     Lipid Panel         Obtain blood work within the next week or 2.  Follow-up with me for blood pressure recheck and lab review in about 4 weeks.    I spent a total of 30 minutes on the day of the visit.This includes face to face time and non-face to face time preparing to see the patient (eg, review of tests), obtaining and/or reviewing separately obtained history, documenting clinical information in the electronic or other health record, independently interpreting results and communicating results to the patient/family/caregiver, or care coordinator.

## 2023-12-16 LAB
BACTERIA UR CULT: NO GROWTH
BACTERIA UR CULT: NORMAL

## 2023-12-19 LAB
ALBUMIN SERPL-MCNC: 4.8 G/DL (ref 3.8–4.9)
ALBUMIN/GLOB SERPL: 2.3 {RATIO} (ref 1.2–2.2)
ALP SERPL-CCNC: 65 IU/L (ref 44–121)
ALT SERPL-CCNC: 25 IU/L (ref 0–32)
AST SERPL-CCNC: 24 IU/L (ref 0–40)
BASOPHILS # BLD AUTO: 0 X10E3/UL (ref 0–0.2)
BASOPHILS NFR BLD AUTO: 1 %
BILIRUB SERPL-MCNC: 0.6 MG/DL (ref 0–1.2)
BUN SERPL-MCNC: 15 MG/DL (ref 6–24)
BUN/CREAT SERPL: 17 (ref 9–23)
CALCIUM SERPL-MCNC: 10 MG/DL (ref 8.7–10.2)
CHLORIDE SERPL-SCNC: 103 MMOL/L (ref 96–106)
CHOLEST SERPL-MCNC: 240 MG/DL (ref 100–199)
CO2 SERPL-SCNC: 20 MMOL/L (ref 20–29)
CREAT SERPL-MCNC: 0.9 MG/DL (ref 0.57–1)
EOSINOPHIL # BLD AUTO: 0.3 X10E3/UL (ref 0–0.4)
EOSINOPHIL NFR BLD AUTO: 6 %
ERYTHROCYTE [DISTWIDTH] IN BLOOD BY AUTOMATED COUNT: 11.1 % (ref 11.7–15.4)
EST. GFR  (NO RACE VARIABLE): 74 ML/MIN/1.73
GLOBULIN SER CALC-MCNC: 2.1 G/DL (ref 1.5–4.5)
GLUCOSE SERPL-MCNC: 103 MG/DL (ref 70–99)
HCT VFR BLD AUTO: 38.2 % (ref 34–46.6)
HDLC SERPL-MCNC: 103 MG/DL
HGB BLD-MCNC: 12.8 G/DL (ref 11.1–15.9)
IMM GRANULOCYTES # BLD AUTO: 0 X10E3/UL (ref 0–0.1)
IMM GRANULOCYTES NFR BLD AUTO: 0 %
LDLC SERPL CALC-MCNC: 126 MG/DL (ref 0–99)
LYMPHOCYTES # BLD AUTO: 1.9 X10E3/UL (ref 0.7–3.1)
LYMPHOCYTES NFR BLD AUTO: 36 %
MCH RBC QN AUTO: 33.5 PG (ref 26.6–33)
MCHC RBC AUTO-ENTMCNC: 33.5 G/DL (ref 31.5–35.7)
MCV RBC AUTO: 100 FL (ref 79–97)
MONOCYTES # BLD AUTO: 0.4 X10E3/UL (ref 0.1–0.9)
MONOCYTES NFR BLD AUTO: 8 %
NEUTROPHILS # BLD AUTO: 2.7 X10E3/UL (ref 1.4–7)
NEUTROPHILS NFR BLD AUTO: 49 %
PLATELET # BLD AUTO: 233 X10E3/UL (ref 150–450)
POTASSIUM SERPL-SCNC: 4.3 MMOL/L (ref 3.5–5.2)
PROT SERPL-MCNC: 6.9 G/DL (ref 6–8.5)
RBC # BLD AUTO: 3.82 X10E6/UL (ref 3.77–5.28)
SODIUM SERPL-SCNC: 139 MMOL/L (ref 134–144)
T3FREE SERPL-MCNC: 2.7 PG/ML (ref 2–4.4)
T4 FREE SERPL-MCNC: 1.22 NG/DL (ref 0.82–1.77)
TRIGL SERPL-MCNC: 68 MG/DL (ref 0–149)
TSH SERPL DL<=0.005 MIU/L-ACNC: 1.84 UIU/ML (ref 0.45–4.5)
VLDLC SERPL CALC-MCNC: 11 MG/DL (ref 5–40)
WBC # BLD AUTO: 5.4 X10E3/UL (ref 3.4–10.8)

## 2024-01-16 ENCOUNTER — OFFICE VISIT (OUTPATIENT)
Dept: FAMILY MEDICINE | Facility: CLINIC | Age: 59
End: 2024-01-16
Payer: COMMERCIAL

## 2024-01-16 VITALS
BODY MASS INDEX: 18.25 KG/M2 | WEIGHT: 103 LBS | DIASTOLIC BLOOD PRESSURE: 82 MMHG | OXYGEN SATURATION: 99 % | SYSTOLIC BLOOD PRESSURE: 145 MMHG | TEMPERATURE: 98 F | HEART RATE: 66 BPM | HEIGHT: 63 IN

## 2024-01-16 DIAGNOSIS — E78.5 MILD HYPERLIPIDEMIA: ICD-10-CM

## 2024-01-16 DIAGNOSIS — I10 PRIMARY HYPERTENSION: Primary | ICD-10-CM

## 2024-01-16 DIAGNOSIS — R63.6 UNDERWEIGHT: ICD-10-CM

## 2024-01-16 DIAGNOSIS — D53.9 MACROCYTIC ANEMIA: ICD-10-CM

## 2024-01-16 PROCEDURE — 1160F RVW MEDS BY RX/DR IN RCRD: CPT | Mod: CPTII,S$GLB,, | Performed by: NURSE PRACTITIONER

## 2024-01-16 PROCEDURE — 99999 PR PBB SHADOW E&M-EST. PATIENT-LVL V: CPT | Mod: PBBFAC,,, | Performed by: NURSE PRACTITIONER

## 2024-01-16 PROCEDURE — 3079F DIAST BP 80-89 MM HG: CPT | Mod: CPTII,S$GLB,, | Performed by: NURSE PRACTITIONER

## 2024-01-16 PROCEDURE — 99213 OFFICE O/P EST LOW 20 MIN: CPT | Mod: S$GLB,,, | Performed by: NURSE PRACTITIONER

## 2024-01-16 PROCEDURE — 4010F ACE/ARB THERAPY RXD/TAKEN: CPT | Mod: CPTII,S$GLB,, | Performed by: NURSE PRACTITIONER

## 2024-01-16 PROCEDURE — 3077F SYST BP >= 140 MM HG: CPT | Mod: CPTII,S$GLB,, | Performed by: NURSE PRACTITIONER

## 2024-01-16 PROCEDURE — 1159F MED LIST DOCD IN RCRD: CPT | Mod: CPTII,S$GLB,, | Performed by: NURSE PRACTITIONER

## 2024-01-16 PROCEDURE — 3008F BODY MASS INDEX DOCD: CPT | Mod: CPTII,S$GLB,, | Performed by: NURSE PRACTITIONER

## 2024-01-16 RX ORDER — UBIDECARENONE 75 MG
500 CAPSULE ORAL DAILY
COMMUNITY
Start: 2024-01-16

## 2024-01-16 RX ORDER — LOSARTAN POTASSIUM 25 MG/1
25 TABLET ORAL DAILY
Qty: 30 TABLET | Refills: 2 | Status: SHIPPED | OUTPATIENT
Start: 2024-01-16 | End: 2024-02-02 | Stop reason: SDUPTHER

## 2024-01-16 NOTE — PROGRESS NOTES
Subjective:       Patient ID: Angel Penaloza is a 58 y.o. female.    Chief Complaint: Hypertension and Follow-up    Patient presents today for follow up on lab review and blood pressure. She was seen as a new patient to me 1 month ago with symptoms of headache, excessive thirst, increased urination, and fatigue. Her blood pressure was mildly elevated.   She obtained her labs. Blood sugar was a few points above goal and she was noted to have macrocytic anemia that is mild.  Patient is underweight with a BMI of 18.25    Hypertension  This is a new problem. The current episode started more than 1 month ago. The problem has been waxing and waning since onset. The problem is uncontrolled. Associated symptoms include malaise/fatigue and neck pain. Pertinent negatives include no chest pain, headaches, palpitations or shortness of breath. There are no associated agents to hypertension. Risk factors for coronary artery disease include stress. Past treatments include nothing. The current treatment provides no improvement. There are no compliance problems.  There is no history of kidney disease. There is no history of chronic renal disease.   Fatigue  This is a new problem. The current episode started 1 to 4 weeks ago. The problem occurs daily. The problem has been waxing and waning. Associated symptoms include fatigue and neck pain. Pertinent negatives include no abdominal pain, arthralgias, chest pain, congestion, coughing, fever, headaches, nausea, rash, sore throat or vomiting. The symptoms are aggravated by stress. She has tried walking, position changes and relaxation for the symptoms. The treatment provided mild relief.     Review of Systems   Constitutional:  Positive for activity change, fatigue and malaise/fatigue. Negative for appetite change and fever.   HENT:  Negative for congestion, ear discharge, ear pain, hearing loss, sore throat, trouble swallowing and voice change.    Eyes:  Negative for photophobia,  pain, discharge and visual disturbance.   Respiratory:  Positive for chest tightness. Negative for cough, shortness of breath and wheezing.    Cardiovascular:  Negative for chest pain and palpitations.        Hypertension   Gastrointestinal:  Positive for constipation. Negative for abdominal pain, blood in stool, diarrhea, nausea and vomiting.   Endocrine: Positive for polydipsia and polyuria. Negative for cold intolerance and heat intolerance.   Genitourinary:  Positive for urgency. Negative for difficulty urinating, dysuria and hematuria.   Musculoskeletal:  Positive for neck pain. Negative for arthralgias and gait problem.   Skin:  Negative for rash.   Allergic/Immunologic: Negative for immunocompromised state.   Neurological:  Negative for speech difficulty and headaches.   Psychiatric/Behavioral:  Negative for confusion, dysphoric mood, self-injury and suicidal ideas.        Past Medical History:   Diagnosis Date    Insomnia, idiopathic     Migraines     PONV (postoperative nausea and vomiting)     Vitamin D deficiency       Past Surgical History:   Procedure Laterality Date    COLONOSCOPY      HERNIA REPAIR      March 2018    MOUTH SURGERY      Gum grafting 3x       Family History   Problem Relation Age of Onset    Heart attack Father     Heart attack Maternal Grandmother     Heart attack Paternal Grandfather     Thyroid disease Mother     Diabetes Mellitus Paternal Grandmother     Breast cancer Paternal Grandmother 75       Social History     Socioeconomic History    Marital status:    Tobacco Use    Smoking status: Never    Smokeless tobacco: Never   Substance and Sexual Activity    Alcohol use: Yes     Comment: socially    Drug use: No    Sexual activity: Yes     Social Determinants of Health     Financial Resource Strain: Low Risk  (12/12/2023)    Overall Financial Resource Strain (CARDIA)     Difficulty of Paying Living Expenses: Not hard at all   Food Insecurity: No Food Insecurity (12/12/2023)     Hunger Vital Sign     Worried About Running Out of Food in the Last Year: Never true     Ran Out of Food in the Last Year: Never true   Transportation Needs: No Transportation Needs (12/12/2023)    PRAPARE - Transportation     Lack of Transportation (Medical): No     Lack of Transportation (Non-Medical): No   Physical Activity: Sufficiently Active (12/12/2023)    Exercise Vital Sign     Days of Exercise per Week: 6 days     Minutes of Exercise per Session: 40 min   Stress: No Stress Concern Present (12/12/2023)    [place] citizen [employer] - Occupational Stress Questionnaire     Feeling of Stress : Only a little   Social Connections: Unknown (12/12/2023)    Social Connection and Isolation Panel [NHANES]     Frequency of Communication with Friends and Family: More than three times a week     Frequency of Social Gatherings with Friends and Family: More than three times a week     Active Member of Clubs or Organizations: Yes     Attends Club or Organization Meetings: More than 4 times per year     Marital Status:    Housing Stability: Low Risk  (12/12/2023)    Housing Stability Vital Sign     Unable to Pay for Housing in the Last Year: No     Number of Places Lived in the Last Year: 1     Unstable Housing in the Last Year: No       Current Outpatient Medications   Medication Sig Dispense Refill    cholecalciferol, vitamin D3, (VITAMIN D3) 25 mcg (1,000 unit) Chew Take by mouth.      estradioL (ESTRACE) 1 MG tablet       fluticasone propionate (FLONASE) 50 mcg/actuation nasal spray 1 spray (50 mcg total) by Each Nostril route 2 (two) times daily. 16 g 1    multivitamin capsule Take 1 capsule by mouth once daily.      progesterone (PROMETRIUM) 200 MG capsule       sumatriptan (IMITREX) 100 MG tablet TK 1 T PO AT HA ONSET. NO MORE THAN 2 TS PER DAY  7    valACYclovir (VALTREX) 1000 MG tablet       cyanocobalamin (B-12 DOTS) 500 MCG tablet Take 1 tablet (500 mcg total) by mouth once daily.       "losartan (COZAAR) 25 MG tablet Take 1 tablet (25 mg total) by mouth once daily. 30 tablet 2     No current facility-administered medications for this visit.       Review of patient's allergies indicates:   Allergen Reactions    Penicillins Anaphylaxis and Rash     augementin       Objective:      Blood pressure (!) 145/82, pulse 66, temperature 97.9 °F (36.6 °C), height 5' 3" (1.6 m), weight 46.7 kg (103 lb), last menstrual period 07/19/2023, SpO2 99 %. Body mass index is 18.25 kg/m².   Physical Exam  Vitals and nursing note reviewed.   Constitutional:       General: She is not in acute distress.     Appearance: Normal appearance. She is well-developed.   HENT:      Head: Normocephalic and atraumatic.      Right Ear: External ear normal.      Left Ear: External ear normal.      Nose: Nose normal.      Mouth/Throat:      Mouth: Mucous membranes are moist.   Eyes:      General: Lids are normal. Lids are everted, no foreign bodies appreciated.      Conjunctiva/sclera: Conjunctivae normal.      Pupils: Pupils are equal, round, and reactive to light.      Right eye: Pupil is round and reactive.      Left eye: Pupil is round and reactive.   Neck:      Trachea: Trachea normal.   Cardiovascular:      Rate and Rhythm: Normal rate and regular rhythm.      Pulses: Normal pulses.      Heart sounds: Normal heart sounds, S1 normal and S2 normal.   Pulmonary:      Effort: Pulmonary effort is normal.      Breath sounds: Normal breath sounds.   Abdominal:      General: Abdomen is flat. Bowel sounds are normal.      Palpations: Abdomen is soft. Abdomen is not rigid.      Tenderness: There is no guarding.   Musculoskeletal:         General: Normal range of motion.      Cervical back: Normal range of motion and neck supple. No muscular tenderness.   Lymphadenopathy:      Cervical: No cervical adenopathy.   Skin:     General: Skin is warm and dry.      Capillary Refill: Capillary refill takes less than 2 seconds.   Neurological:      " General: No focal deficit present.      Mental Status: She is alert and oriented to person, place, and time.   Psychiatric:         Mood and Affect: Mood is anxious. Affect is labile.         Speech: Speech is rapid and pressured.         Behavior: Behavior normal. Behavior is cooperative.         Thought Content: Thought content normal.         Judgment: Judgment normal.             Assessment:       1. Primary hypertension    2. Macrocytic anemia    3. Mild hyperlipidemia    4. Underweight        Plan:       Angel was seen today for hypertension and follow-up.    Diagnoses and all orders for this visit:    Primary hypertension  -     losartan (COZAAR) 25 MG tablet; Take 1 tablet (25 mg total) by mouth once daily.  Lifestyle changes: Reduce the amount of salt in your diet; Lose weight; Avoid drinking too much alcohol; Exercise at least 30 minutes per day most days of the week.  Continue current medications and home BP monitoring.      Macrocytic anemia  -     cyanocobalamin (B-12 DOTS) 500 MCG tablet; Take 1 tablet (500 mcg total) by mouth once daily.    Mild hyperlipidemia  Limit red meat, butter, fried foods, cheese, and other foods that have a lot of saturated fat. Consume more: lean meats, fish, fruits, vegetables, whole grains, beans, lentils, and nuts.  Weight loss, and 30-45 min of cardiovascular exercise daily.      Underweight  The patient is asked to make an attempt to improve diet and exercise patterns to aid in medical management of this problem.      Starting on losartan 25 mg daily    Follow up in 1 month for recheck on BP since starting medication.

## 2024-01-30 ENCOUNTER — PATIENT MESSAGE (OUTPATIENT)
Dept: FAMILY MEDICINE | Facility: CLINIC | Age: 59
End: 2024-01-30
Payer: COMMERCIAL

## 2024-01-30 DIAGNOSIS — I10 PRIMARY HYPERTENSION: ICD-10-CM

## 2024-01-31 ENCOUNTER — TELEPHONE (OUTPATIENT)
Dept: FAMILY MEDICINE | Facility: CLINIC | Age: 59
End: 2024-01-31
Payer: COMMERCIAL

## 2024-01-31 VITALS — DIASTOLIC BLOOD PRESSURE: 78 MMHG | SYSTOLIC BLOOD PRESSURE: 141 MMHG

## 2024-02-02 ENCOUNTER — TELEPHONE (OUTPATIENT)
Dept: FAMILY MEDICINE | Facility: CLINIC | Age: 59
End: 2024-02-02
Payer: COMMERCIAL

## 2024-02-02 RX ORDER — LOSARTAN POTASSIUM 50 MG/1
50 TABLET ORAL DAILY
Qty: 30 TABLET | Refills: 2 | Status: SHIPPED | OUTPATIENT
Start: 2024-02-02 | End: 2024-02-15 | Stop reason: SDUPTHER

## 2024-02-02 NOTE — TELEPHONE ENCOUNTER
Pt called sent you some bp readings earlier in the week.  Would like for you to call her after looking at the readings.

## 2024-02-15 ENCOUNTER — OFFICE VISIT (OUTPATIENT)
Dept: FAMILY MEDICINE | Facility: CLINIC | Age: 59
End: 2024-02-15
Payer: COMMERCIAL

## 2024-02-15 VITALS
OXYGEN SATURATION: 99 % | DIASTOLIC BLOOD PRESSURE: 68 MMHG | BODY MASS INDEX: 17.72 KG/M2 | WEIGHT: 100 LBS | HEART RATE: 74 BPM | TEMPERATURE: 98 F | SYSTOLIC BLOOD PRESSURE: 128 MMHG | HEIGHT: 63 IN

## 2024-02-15 DIAGNOSIS — I10 PRIMARY HYPERTENSION: Primary | ICD-10-CM

## 2024-02-15 DIAGNOSIS — R63.6 UNDERWEIGHT: ICD-10-CM

## 2024-02-15 DIAGNOSIS — D53.9 MACROCYTIC ANEMIA: ICD-10-CM

## 2024-02-15 DIAGNOSIS — E78.5 MILD HYPERLIPIDEMIA: ICD-10-CM

## 2024-02-15 DIAGNOSIS — H65.03 BILATERAL ACUTE SEROUS OTITIS MEDIA, RECURRENCE NOT SPECIFIED: ICD-10-CM

## 2024-02-15 DIAGNOSIS — B00.1 HERPES LABIALIS: ICD-10-CM

## 2024-02-15 DIAGNOSIS — J01.00 ACUTE MAXILLARY SINUSITIS, RECURRENCE NOT SPECIFIED: ICD-10-CM

## 2024-02-15 PROCEDURE — 3078F DIAST BP <80 MM HG: CPT | Mod: CPTII,S$GLB,, | Performed by: NURSE PRACTITIONER

## 2024-02-15 PROCEDURE — 1160F RVW MEDS BY RX/DR IN RCRD: CPT | Mod: CPTII,S$GLB,, | Performed by: NURSE PRACTITIONER

## 2024-02-15 PROCEDURE — 3074F SYST BP LT 130 MM HG: CPT | Mod: CPTII,S$GLB,, | Performed by: NURSE PRACTITIONER

## 2024-02-15 PROCEDURE — 4010F ACE/ARB THERAPY RXD/TAKEN: CPT | Mod: CPTII,S$GLB,, | Performed by: NURSE PRACTITIONER

## 2024-02-15 PROCEDURE — 99999 PR PBB SHADOW E&M-EST. PATIENT-LVL V: CPT | Mod: PBBFAC,,, | Performed by: NURSE PRACTITIONER

## 2024-02-15 PROCEDURE — 3008F BODY MASS INDEX DOCD: CPT | Mod: CPTII,S$GLB,, | Performed by: NURSE PRACTITIONER

## 2024-02-15 PROCEDURE — 1159F MED LIST DOCD IN RCRD: CPT | Mod: CPTII,S$GLB,, | Performed by: NURSE PRACTITIONER

## 2024-02-15 PROCEDURE — 99214 OFFICE O/P EST MOD 30 MIN: CPT | Mod: S$GLB,,, | Performed by: NURSE PRACTITIONER

## 2024-02-15 RX ORDER — LOSARTAN POTASSIUM 50 MG/1
50 TABLET ORAL DAILY
Qty: 90 TABLET | Refills: 1 | Status: SHIPPED | OUTPATIENT
Start: 2024-02-15 | End: 2025-02-14

## 2024-02-15 RX ORDER — VALACYCLOVIR HYDROCHLORIDE 1 G/1
TABLET, FILM COATED ORAL
Qty: 30 TABLET | Refills: 2 | Status: SHIPPED | OUTPATIENT
Start: 2024-02-15

## 2024-02-15 RX ORDER — FLUTICASONE PROPIONATE 50 MCG
1 SPRAY, SUSPENSION (ML) NASAL 2 TIMES DAILY
Qty: 16 G | Refills: 5 | Status: SHIPPED | OUTPATIENT
Start: 2024-02-15

## 2024-02-15 NOTE — PROGRESS NOTES
Subjective:       Patient ID: Angel Penaloza is a 58 y.o. female.    Chief Complaint: Hypertension and Chest Pain    Patient presents today for follow up on lab review and blood pressure.  At her last visit a month ago, she started on losartan 25 mg p.o. daily for hypertension.  After 2 weeks on 25 mg, patient's blood pressure was still running slightly elevated per her reports of home blood pressure monitoring.  Dose was increased to 50 mg daily and she has done well with this.  Blood pressure well controlled today.  Labs were reviewed at her last visit.  She has no side effects with the losartan.    She also has recurrent fever blisters and allergic rhinitis.  Patient takes Flonase and Valtrex as needed for this.  Patient is requesting refills today.    Hypertension  This is a chronic problem. The current episode started more than 1 month ago. The problem has been waxing and waning since onset. The problem is uncontrolled. Associated symptoms include chest pain, malaise/fatigue and neck pain. Pertinent negatives include no anxiety, blurred vision, headaches, orthopnea, palpitations, peripheral edema, PND, shortness of breath or sweats. There are no associated agents to hypertension. Risk factors for coronary artery disease include stress. Past treatments include angiotensin blockers. The current treatment provides significant improvement. There are no compliance problems.  There is no history of kidney disease. There is no history of chronic renal disease.   Gastroesophageal Reflux  She complains of chest pain and heartburn. She reports no abdominal pain, no coughing, no nausea, no sore throat or no wheezing. The current episode started 1 to 4 weeks ago. The problem occurs occasionally. The problem has been waxing and waning. The heartburn duration is less than a minute. The heartburn is located in the substernum. The heartburn is of moderate intensity. The heartburn does not wake her from sleep. The  heartburn does not limit her activity. The heartburn changes with position. The symptoms are aggravated by certain foods. Associated symptoms include fatigue. Pertinent negatives include no melena. There are no known risk factors. She has tried nothing for the symptoms. The treatment provided no relief.     Review of Systems   Constitutional:  Positive for activity change, fatigue and malaise/fatigue. Negative for appetite change and fever.   HENT:  Negative for congestion, ear discharge, ear pain, hearing loss, sore throat, trouble swallowing and voice change.    Eyes:  Negative for blurred vision, photophobia, pain, discharge and visual disturbance.   Respiratory:  Positive for chest tightness. Negative for cough, shortness of breath and wheezing.    Cardiovascular:  Positive for chest pain. Negative for palpitations, orthopnea and PND.        Hypertension   Gastrointestinal:  Positive for constipation and heartburn. Negative for abdominal pain, blood in stool, diarrhea, melena, nausea and vomiting.        Indigestion   Endocrine: Positive for polydipsia and polyuria. Negative for cold intolerance and heat intolerance.   Genitourinary:  Positive for urgency. Negative for difficulty urinating, dysuria and hematuria.   Musculoskeletal:  Positive for neck pain. Negative for arthralgias and gait problem.   Skin:  Negative for rash.   Allergic/Immunologic: Negative for immunocompromised state.   Neurological:  Negative for speech difficulty and headaches.   Psychiatric/Behavioral:  Negative for confusion, dysphoric mood, self-injury and suicidal ideas.        Past Medical History:   Diagnosis Date    Insomnia, idiopathic     Migraines     PONV (postoperative nausea and vomiting)     Vitamin D deficiency       Past Surgical History:   Procedure Laterality Date    COLONOSCOPY      HERNIA REPAIR      March 2018    MOUTH SURGERY      Gum grafting 3x       Family History   Problem Relation Age of Onset    Heart attack  Father     Heart attack Maternal Grandmother     Heart attack Paternal Grandfather     Thyroid disease Mother     Diabetes Mellitus Paternal Grandmother     Breast cancer Paternal Grandmother 75       Social History     Socioeconomic History    Marital status:    Tobacco Use    Smoking status: Never    Smokeless tobacco: Never   Substance and Sexual Activity    Alcohol use: Yes     Comment: socially    Drug use: No    Sexual activity: Yes     Social Determinants of Health     Financial Resource Strain: Low Risk  (12/12/2023)    Overall Financial Resource Strain (CARDIA)     Difficulty of Paying Living Expenses: Not hard at all   Food Insecurity: No Food Insecurity (12/12/2023)    Hunger Vital Sign     Worried About Running Out of Food in the Last Year: Never true     Ran Out of Food in the Last Year: Never true   Transportation Needs: No Transportation Needs (12/12/2023)    PRAPARE - Transportation     Lack of Transportation (Medical): No     Lack of Transportation (Non-Medical): No   Physical Activity: Sufficiently Active (12/12/2023)    Exercise Vital Sign     Days of Exercise per Week: 6 days     Minutes of Exercise per Session: 40 min   Stress: No Stress Concern Present (12/12/2023)    [place] [employer] - Occupational Stress Questionnaire     Feeling of Stress : Only a little   Social Connections: Unknown (12/12/2023)    Social Connection and Isolation Panel [NHANES]     Frequency of Communication with Friends and Family: More than three times a week     Frequency of Social Gatherings with Friends and Family: More than three times a week     Active Member of Clubs or Organizations: Yes     Attends Club or Organization Meetings: More than 4 times per year     Marital Status:    Housing Stability: Low Risk  (12/12/2023)    Housing Stability Vital Sign     Unable to Pay for Housing in the Last Year: No     Number of Places Lived in the Last Year: 1     Unstable Housing in the  "Last Year: No       Current Outpatient Medications   Medication Sig Dispense Refill    cholecalciferol, vitamin D3, (VITAMIN D3) 25 mcg (1,000 unit) Chew Take by mouth.      cyanocobalamin (B-12 DOTS) 500 MCG tablet Take 1 tablet (500 mcg total) by mouth once daily.      estradioL (ESTRACE) 1 MG tablet       losartan (COZAAR) 50 MG tablet Take 1 tablet (50 mg total) by mouth once daily. 30 tablet 2    multivitamin capsule Take 1 capsule by mouth once daily.      progesterone (PROMETRIUM) 200 MG capsule       sumatriptan (IMITREX) 100 MG tablet TK 1 T PO AT HA ONSET. NO MORE THAN 2 TS PER DAY  7    fluticasone propionate (FLONASE) 50 mcg/actuation nasal spray 1 spray (50 mcg total) by Each Nostril route 2 (two) times daily. 16 g 5    valACYclovir (VALTREX) 1000 MG tablet Take 2 tablets PO at onset of fever blister. Repeat 2 tablets PO 12 hours later. 30 tablet 2     No current facility-administered medications for this visit.       Review of patient's allergies indicates:   Allergen Reactions    Penicillins Anaphylaxis and Rash     augementin       Objective:      Blood pressure 128/68, pulse 74, temperature 97.6 °F (36.4 °C), height 5' 3" (1.6 m), weight 45.4 kg (100 lb), last menstrual period 07/19/2023, SpO2 99 %. Body mass index is 17.71 kg/m².   Physical Exam  Vitals and nursing note reviewed.   Constitutional:       General: She is not in acute distress.     Appearance: Normal appearance. She is well-developed.   HENT:      Head: Normocephalic and atraumatic.      Right Ear: External ear normal.      Left Ear: External ear normal.      Nose: Nose normal.      Mouth/Throat:      Mouth: Mucous membranes are moist.      Pharynx: Oropharynx is clear.   Eyes:      General: Lids are normal. Lids are everted, no foreign bodies appreciated.      Conjunctiva/sclera: Conjunctivae normal.      Pupils: Pupils are equal, round, and reactive to light.      Right eye: Pupil is round and reactive.      Left eye: Pupil is round " and reactive.   Neck:      Trachea: Trachea normal.   Cardiovascular:      Rate and Rhythm: Normal rate and regular rhythm.      Pulses: Normal pulses.      Heart sounds: Normal heart sounds, S1 normal and S2 normal. No murmur heard.     No friction rub. No gallop.   Pulmonary:      Effort: Pulmonary effort is normal. No respiratory distress.      Breath sounds: Normal breath sounds.   Abdominal:      General: Abdomen is flat. Bowel sounds are normal.      Palpations: Abdomen is soft. Abdomen is not rigid.      Tenderness: There is no guarding.   Musculoskeletal:         General: Normal range of motion.      Cervical back: Normal range of motion and neck supple. No muscular tenderness.   Lymphadenopathy:      Cervical: No cervical adenopathy.   Skin:     General: Skin is warm and dry.      Capillary Refill: Capillary refill takes less than 2 seconds.   Neurological:      General: No focal deficit present.      Mental Status: She is alert and oriented to person, place, and time.   Psychiatric:         Mood and Affect: Mood is anxious. Affect is labile.         Speech: Speech is rapid and pressured.         Behavior: Behavior normal. Behavior is cooperative.         Thought Content: Thought content normal.         Judgment: Judgment normal.             Assessment:       1. Primary hypertension    2. Macrocytic anemia    3. Mild hyperlipidemia    4. Herpes labialis    5. Bilateral acute serous otitis media, recurrence not specified    6. Acute maxillary sinusitis, recurrence not specified    7. Underweight          Plan:       Angel was seen today for hypertension and chest pain.    Diagnoses and all orders for this visit:    Primary hypertension  -     CBC Auto Differential; Future  -     Comprehensive Metabolic Panel; Future  -     Urinalysis; Future  -     CBC Auto Differential  -     Comprehensive Metabolic Panel  -     Urinalysis    Macrocytic anemia  -     CBC Auto Differential; Future  -     Vitamin B12;  Future  -     CBC Auto Differential  -     Vitamin B12    Mild hyperlipidemia  -     Lipid Panel; Future  -     Lipid Panel    Herpes labialis  -     valACYclovir (VALTREX) 1000 MG tablet; Take 2 tablets PO at onset of fever blister. Repeat 2 tablets PO 12 hours later.    Bilateral acute serous otitis media, recurrence not specified  -     fluticasone propionate (FLONASE) 50 mcg/actuation nasal spray; 1 spray (50 mcg total) by Each Nostril route 2 (two) times daily.    Acute maxillary sinusitis, recurrence not specified  -     fluticasone propionate (FLONASE) 50 mcg/actuation nasal spray; 1 spray (50 mcg total) by Each Nostril route 2 (two) times daily.    Underweight  The patient is asked to make an attempt to improve diet and exercise patterns to aid in medical management of this problem.      Follow-up with me in 6 months with labs prior to office visit chronic condition management.  Follow-up sooner if needed

## 2024-05-29 DIAGNOSIS — Z12.31 ENCOUNTER FOR SCREENING MAMMOGRAM FOR MALIGNANT NEOPLASM OF BREAST: Primary | ICD-10-CM

## 2024-06-20 ENCOUNTER — HOSPITAL ENCOUNTER (OUTPATIENT)
Dept: RADIOLOGY | Facility: HOSPITAL | Age: 59
Discharge: HOME | End: 2024-06-20
Attending: OBSTETRICS & GYNECOLOGY
Payer: COMMERCIAL

## 2024-06-20 DIAGNOSIS — Z12.31 ENCOUNTER FOR SCREENING MAMMOGRAM FOR MALIGNANT NEOPLASM OF BREAST: ICD-10-CM

## 2024-06-20 PROCEDURE — 77063 BREAST TOMOSYNTHESIS BI: CPT | Mod: 26,,, | Performed by: RADIOLOGY

## 2024-06-20 PROCEDURE — 77067 SCR MAMMO BI INCL CAD: CPT | Mod: 26,,, | Performed by: RADIOLOGY

## 2024-06-20 PROCEDURE — 77067 SCR MAMMO BI INCL CAD: CPT | Mod: TC,PO

## 2024-08-13 ENCOUNTER — TELEPHONE (OUTPATIENT)
Dept: FAMILY MEDICINE | Facility: CLINIC | Age: 59
End: 2024-08-13
Payer: COMMERCIAL

## 2024-08-13 DIAGNOSIS — D53.9 MACROCYTIC ANEMIA: Primary | ICD-10-CM

## 2024-08-13 DIAGNOSIS — E78.5 MILD HYPERLIPIDEMIA: ICD-10-CM

## 2024-08-13 DIAGNOSIS — I10 PRIMARY HYPERTENSION: ICD-10-CM

## 2024-08-15 ENCOUNTER — PATIENT MESSAGE (OUTPATIENT)
Dept: FAMILY MEDICINE | Facility: CLINIC | Age: 59
End: 2024-08-15
Payer: COMMERCIAL

## 2024-08-16 ENCOUNTER — PATIENT MESSAGE (OUTPATIENT)
Dept: FAMILY MEDICINE | Facility: CLINIC | Age: 59
End: 2024-08-16
Payer: COMMERCIAL

## 2024-08-16 DIAGNOSIS — D53.9 MACROCYTIC ANEMIA: Primary | ICD-10-CM

## 2024-08-19 ENCOUNTER — TELEPHONE (OUTPATIENT)
Dept: FAMILY MEDICINE | Facility: CLINIC | Age: 59
End: 2024-08-19
Payer: COMMERCIAL

## 2024-08-21 ENCOUNTER — OFFICE VISIT (OUTPATIENT)
Dept: FAMILY MEDICINE | Facility: CLINIC | Age: 59
End: 2024-08-21
Payer: COMMERCIAL

## 2024-08-21 VITALS
BODY MASS INDEX: 18.23 KG/M2 | OXYGEN SATURATION: 99 % | WEIGHT: 102.88 LBS | SYSTOLIC BLOOD PRESSURE: 135 MMHG | TEMPERATURE: 98 F | DIASTOLIC BLOOD PRESSURE: 80 MMHG | HEART RATE: 81 BPM | HEIGHT: 63 IN

## 2024-08-21 DIAGNOSIS — I10 PRIMARY HYPERTENSION: ICD-10-CM

## 2024-08-21 DIAGNOSIS — H61.21 CERUMINOSIS, RIGHT: ICD-10-CM

## 2024-08-21 DIAGNOSIS — E78.5 MILD HYPERLIPIDEMIA: ICD-10-CM

## 2024-08-21 DIAGNOSIS — D53.9 MACROCYTIC ANEMIA: Primary | ICD-10-CM

## 2024-08-21 PROCEDURE — 3075F SYST BP GE 130 - 139MM HG: CPT | Mod: CPTII,S$GLB,, | Performed by: NURSE PRACTITIONER

## 2024-08-21 PROCEDURE — 99999 PR PBB SHADOW E&M-EST. PATIENT-LVL IV: CPT | Mod: PBBFAC,,, | Performed by: NURSE PRACTITIONER

## 2024-08-21 PROCEDURE — 3008F BODY MASS INDEX DOCD: CPT | Mod: CPTII,S$GLB,, | Performed by: NURSE PRACTITIONER

## 2024-08-21 PROCEDURE — 1159F MED LIST DOCD IN RCRD: CPT | Mod: CPTII,S$GLB,, | Performed by: NURSE PRACTITIONER

## 2024-08-21 PROCEDURE — 99214 OFFICE O/P EST MOD 30 MIN: CPT | Mod: S$GLB,,, | Performed by: NURSE PRACTITIONER

## 2024-08-21 PROCEDURE — 3079F DIAST BP 80-89 MM HG: CPT | Mod: CPTII,S$GLB,, | Performed by: NURSE PRACTITIONER

## 2024-08-21 PROCEDURE — 4010F ACE/ARB THERAPY RXD/TAKEN: CPT | Mod: CPTII,S$GLB,, | Performed by: NURSE PRACTITIONER

## 2024-08-21 PROCEDURE — 1160F RVW MEDS BY RX/DR IN RCRD: CPT | Mod: CPTII,S$GLB,, | Performed by: NURSE PRACTITIONER

## 2024-08-21 NOTE — PROGRESS NOTES
Subjective:       Patient ID: Angel Penaloza is a 59 y.o. female.    Chief Complaint: Headache (Dull headache every day. ), Dizziness (Onest 6-7 months ago. She states that with postural changes it occurs. She states when she bends down and stands back up things goes black and when she starts walking she goes back to normal. ), Fatigue, and Insomnia    Patient presents today for follow up on lab review and blood pressure.   She has completed her labs. Most lab values such as the blood sugar and cholesterol have improved.  She voices concern over anxiety that is intermittent. She has not taken medication for this and is unsure if she wants to try anything.   Patient is underweight with a BMI of 18.23    Hypertension  This is a chronic problem. The current episode started yesterday. The problem has been waxing and waning since onset. The problem is uncontrolled. Associated symptoms include headaches. Pertinent negatives include no chest pain, neck pain, palpitations, peripheral edema or shortness of breath. There are no associated agents to hypertension. Risk factors for coronary artery disease include stress. Past treatments include angiotensin blockers. The current treatment provides significant improvement. There are no compliance problems.  There is no history of kidney disease. There is no history of chronic renal disease.   Gastroesophageal Reflux  She reports no abdominal pain, no chest pain, no coughing, no nausea, no sore throat or no wheezing. The current episode started 1 to 4 weeks ago. The problem occurs occasionally. The problem has been waxing and waning. The heartburn duration is less than a minute. The heartburn is located in the substernum. The heartburn is of moderate intensity. The heartburn does not wake her from sleep. The heartburn does not limit her activity. The heartburn changes with position. The symptoms are aggravated by certain foods. There are no known risk factors. She has tried  nothing for the symptoms. The treatment provided no relief.     Review of Systems   Constitutional:  Negative for activity change, appetite change, fever and unexpected weight change.   HENT:  Negative for congestion, ear discharge, ear pain, hearing loss, rhinorrhea, sore throat, trouble swallowing and voice change.    Eyes:  Negative for photophobia, pain, discharge and visual disturbance.   Respiratory:  Positive for chest tightness. Negative for cough, shortness of breath and wheezing.    Cardiovascular:  Negative for chest pain and palpitations.        Hypertension   Gastrointestinal:  Positive for constipation. Negative for abdominal pain, blood in stool, diarrhea, nausea and vomiting.        Indigestion   Endocrine: Negative for cold intolerance, heat intolerance, polydipsia and polyuria.   Genitourinary:  Negative for difficulty urinating, dysuria, hematuria and menstrual problem.   Musculoskeletal:  Negative for arthralgias, gait problem, joint swelling and neck pain.   Skin:  Negative for rash.   Allergic/Immunologic: Negative for immunocompromised state.   Neurological:  Positive for weakness and headaches. Negative for speech difficulty.   Psychiatric/Behavioral:  Negative for confusion, dysphoric mood, self-injury and suicidal ideas.        Past Medical History:   Diagnosis Date    Insomnia, idiopathic     Migraines     PONV (postoperative nausea and vomiting)     Vitamin D deficiency       Past Surgical History:   Procedure Laterality Date    COLONOSCOPY      HERNIA REPAIR      March 2018    MOUTH SURGERY      Gum grafting 3x       Family History   Problem Relation Name Age of Onset    Heart attack Father      Heart attack Maternal Grandmother      Heart attack Paternal Grandfather      Thyroid disease Mother      Diabetes Mellitus Paternal Grandmother      Breast cancer Paternal Grandmother  75       Social History     Socioeconomic History    Marital status:    Tobacco Use    Smoking status:  Never    Smokeless tobacco: Never   Substance and Sexual Activity    Alcohol use: Yes     Comment: socially    Drug use: No    Sexual activity: Yes     Social Determinants of Health     Financial Resource Strain: Low Risk  (12/12/2023)    Overall Financial Resource Strain (CARDIA)     Difficulty of Paying Living Expenses: Not hard at all   Food Insecurity: No Food Insecurity (12/12/2023)    Hunger Vital Sign     Worried About Running Out of Food in the Last Year: Never true     Ran Out of Food in the Last Year: Never true   Transportation Needs: No Transportation Needs (12/12/2023)    PRAPARE - Transportation     Lack of Transportation (Medical): No     Lack of Transportation (Non-Medical): No   Physical Activity: Sufficiently Active (12/12/2023)    Exercise Vital Sign     Days of Exercise per Week: 6 days     Minutes of Exercise per Session: 40 min   Stress: No Stress Concern Present (12/12/2023)    [employer] - Occupational Stress Questionnaire     Feeling of Stress : Only a little   Housing Stability: Low Risk  (12/12/2023)    Housing Stability Vital Sign     Unable to Pay for Housing in the Last Year: No     Number of Places Lived in the Last Year: 1     Unstable Housing in the Last Year: No       Current Outpatient Medications   Medication Sig Dispense Refill    cholecalciferol, vitamin D3, (VITAMIN D3) 25 mcg (1,000 unit) Chew Take by mouth.      cyanocobalamin (B-12 DOTS) 500 MCG tablet Take 1 tablet (500 mcg total) by mouth once daily.      estradioL (ESTRACE) 1 MG tablet       fluticasone propionate (FLONASE) 50 mcg/actuation nasal spray 1 spray (50 mcg total) by Each Nostril route 2 (two) times daily. 16 g 5    losartan (COZAAR) 50 MG tablet Take 1 tablet (50 mg total) by mouth once daily. 90 tablet 1    multivitamin capsule Take 1 capsule by mouth once daily.      progesterone (PROMETRIUM) 200 MG capsule       sumatriptan (IMITREX) 100 MG tablet TK 1 T PO AT HA ONSET. NO MORE  "THAN 2 TS PER DAY  7    valACYclovir (VALTREX) 1000 MG tablet Take 2 tablets PO at onset of fever blister. Repeat 2 tablets PO 12 hours later. 30 tablet 2    carbamide peroxide (DEBROX) 6.5 % otic solution Place 5 drops into the right ear 2 (two) times daily.       No current facility-administered medications for this visit.       Review of patient's allergies indicates:   Allergen Reactions    Penicillins Anaphylaxis and Rash     augementin       Objective:      Blood pressure 135/80, pulse 81, temperature 98.1 °F (36.7 °C), temperature source Oral, height 5' 3" (1.6 m), weight 46.7 kg (102 lb 14.4 oz), SpO2 99%. Body mass index is 18.23 kg/m².   Physical Exam  Vitals and nursing note reviewed.   Constitutional:       General: She is not in acute distress.     Appearance: Normal appearance. She is well-developed.   HENT:      Head: Normocephalic and atraumatic.      Right Ear: External ear normal. There is impacted cerumen (small amount of cerumen noted adhered to TM).      Left Ear: Tympanic membrane, ear canal and external ear normal.      Nose: Nose normal.      Mouth/Throat:      Mouth: Mucous membranes are moist.      Pharynx: Oropharynx is clear.   Eyes:      General: Lids are normal. Lids are everted, no foreign bodies appreciated.      Conjunctiva/sclera: Conjunctivae normal.      Pupils: Pupils are equal, round, and reactive to light.      Right eye: Pupil is round and reactive.      Left eye: Pupil is round and reactive.   Neck:      Trachea: Trachea normal.   Cardiovascular:      Rate and Rhythm: Regular rhythm.      Pulses: Normal pulses.      Heart sounds: Normal heart sounds, S1 normal and S2 normal. No murmur heard.     No friction rub. No gallop.   Pulmonary:      Effort: Pulmonary effort is normal. No respiratory distress.      Breath sounds: Normal breath sounds.   Abdominal:      General: Abdomen is flat. Bowel sounds are normal.      Palpations: Abdomen is soft. Abdomen is not rigid.      " Tenderness: There is no guarding.   Musculoskeletal:         General: Normal range of motion.      Cervical back: Normal range of motion and neck supple. No muscular tenderness.   Lymphadenopathy:      Cervical: No cervical adenopathy.   Skin:     General: Skin is warm and dry.      Capillary Refill: Capillary refill takes less than 2 seconds.   Neurological:      General: No focal deficit present.      Mental Status: She is alert and oriented to person, place, and time.   Psychiatric:         Mood and Affect: Mood is anxious. Affect is labile.         Speech: Speech is rapid and pressured.         Behavior: Behavior normal. Behavior is cooperative.         Thought Content: Thought content normal.         Judgment: Judgment normal.             Assessment:       1. Macrocytic anemia    2. Mild hyperlipidemia    3. Primary hypertension    4. Ceruminosis, right            Plan:       Angel was seen today for headache, dizziness, fatigue and insomnia.    Diagnoses and all orders for this visit:    Macrocytic anemia  -     Vitamin B12; Future  -     Vitamin B12    Mild hyperlipidemia  -     Lipid Panel; Future  -     Lipid Panel    Primary hypertension  -     CBC Auto Differential; Future  -     Comprehensive Metabolic Panel; Future  -     Urinalysis; Future  -     CBC Auto Differential  -     Comprehensive Metabolic Panel  -     Urinalysis    Ceruminosis, right  -     carbamide peroxide (DEBROX) 6.5 % otic solution; Place 5 drops into the right ear 2 (two) times daily.       Follow-up with me in 6 months with labs prior to office visit chronic condition management.  Follow-up sooner if needed          I spent a total of 30 minutes on the day of the visit.  This includes face to face time and non-face to face time preparing to see the patient (eg, review of tests), obtaining and/or reviewing separately obtained history, documenting clinical information in the electronic or other health record, independently  interpreting results and communicating results to the patient/family/caregiver, or care coordinator.

## 2024-08-21 NOTE — PATIENT INSTRUCTIONS
Patient Education       Planning for a Balanced Diet   General   A balanced diet gives you the right mix of foods from all of the food groups. This will help you make sure your body gets the nutrients it needs to keep you healthy. The number of calories you eat determines if you will gain or lose weight. How many calories you need each day depends on a few things. These are your age, gender, size, and how active you are. Your body needs a balance of foods to:  Give quick energy. These are mainly carbohydrates.  Help grow and repair the body. These are mainly proteins.  Give long-term energy. These are mainly fats.  Your body also gets the vitamins and minerals it needs from the foods you eat. They are needed to keep your body working well. These include:  Vitamin A ? Good for the eyes  Vitamin B ? Good for the nerves  Vitamin C ? Needed to fight germs and illnesses  Vitamin D ? Needed to absorb calcium  Vitamin E ? Good for the skin  Iron ? Needed by the blood  Calcium ? For strong bones  Sodium ? Good for fluid balance and muscle contraction  Iodine, magnesium, potassium ? For different body functions  Fiber ? Good for digestive health, blood glucose control, and heart health         What will the results be?   Eating a healthy diet may help you:  Prevent health issues like high blood sugar, heart disease, and bone loss.  Protect against certain types of cancers.  Avoid additional drugs.  Gain or lose weight.  Have more energy.  Stay healthy during the flu season.  What lifestyle changes are needed?   Combine healthy eating with regular exercise to get the most benefit.  Do not skip meals. Plan small meals or snacks throughout the day. Being prepared for your day will help you avoid impulse eating. Scheduling your meal and snack times will help avoid hunger and over eating.  Drink water throughout the day.  What changes to diet are needed?   Try to eat fewer foods that are high in solid fats, added sugars, and  "salt. Focus on eating more fruits and vegetables, lean meats, low fat or fat-free dairy products, and whole grains.  Who should use this diet?   A healthy diet is good for everyone.  What foods are good to eat?   Grains   Good source of carbohydrates and fiber  Includes bread, rice, pasta, and cereal  Read food labels and look for whole grain as the first ingredient  Try to eat 5 to 8 servings of whole grain, high fiber foods each day.  Fruits and Vegetables   Good source of fiber, vitamins, and minerals  Pick many kinds and colors of fruits and vegetables.  [place] with roasting, baking, grilling, and boiling your vegetables.  Pack raw vegetables in a fun container for a crisp healthy snack on the go.  Try to eat 1 1/2 to 2 cups (360 to 480 grams) of fruit and 2 to 3 cups (480 to 720 grams) of vegetables each day.  Milk   Good source of protein, vitamins, and minerals.  Choose low fat (1%) or fat-free milk. Eat nonfat or low-fat cheeses, yogurts, and other dairy products.  Try to eat or drink 3 cups (720 mL or 720 grams) of dairy each day.  Meats and Beans   Good source of protein  Try to eat more low-fat or lean meats like chicken and turkey. Lean red meat choices like roast and round steak are preferred. Look for ground beef choices with "90 percent lean" on the label. Add fish, beans, and lentils to your weekly menu. Eggs and nut butters like peanut and almond are good sources of protein as well.  Avoid frying and adding breading to your meats.  Grilling meats can add desirable flavors.  Try to eat 5 to 6 ounces (150 to 180 grams) of protein each day.  Fats   Good fats can give you long-term energy.  Good fats can be found in fish, nuts, and avocados. Corn, safflower, sunflower, canola, and soybeans oils are from plant sources and do not contain cholesterol. Use in limited small portions when an oil is needed. Mayonnaise and creamy salad dressing are high in fat and calories. Read the nutrition label for a " "proper serving size.  Fatty fish, like salmon, tuna, mackerel, herring, and trout, are good to eat. These have Omega-3 fatty acids which may help prevent health issues, like heart disease.  What foods should be limited or avoided?   Grains   Commercially-baked pastries and snack foods  Candy bars  Breads and pastas that list "enriched" as the first ingredient  Fruits and Vegetables   Canned fruit may have added sugar and contains less fiber.  Avoid canned fruit in syrup.  Avoid canned vegetables with added salt.  Avoid frying or adding butter to your vegetables.  Milk   Whole-fat dairy products, like whole milk  Butter  Ice cream  Cheese  Meats and Beans   High-fat cuts of meat (from beef, lamb, and pork)  Chicken with the skin or breading  Canned meats that are high in sodium and fat  Deli meats and frozen meals  Fats   Palm and coconut oil  Lard  Stick margarine  Partially-hydrogenated oils  Other Foods   Food with saturated fat and transfat  Food and drinks with lots of sugar. This includes beer, wine, or mixed drinks (alcohol); sports drinks; energy drinks; carbonated soda; cakes; and cookies.  Salty foods. Many snack foods have lots of extra salt and preservatives.  Helpful tips   Keep a journal of the foods you eat each day. This will help you keep track of the calories you are eating.  When you go to a grocery store, have a list or a meal plan to help you shop. Do not shop when you are hungry to avoid cravings for foods.  Read food labels with care. They will show you how much is in a serving. This amount is given as a percent of the total amount you need each day. Reading labels will help you make healthy food choices.  Avoid fast foods.  Balance is important. It is OK to enjoy the foods you like in smaller portions.  Talk to a dietitian for help.  Where can I learn more?   [employer] Food Guide  http://www.hc-sc.gc.ca/fn-an/food-guide-aliment/basics-base/quantit-eng.php "   KidsHealth  http://kidshealth.org/parent/nutrition_center/healthy_eating/habits.html   My Plate  http://www.choosemyplate.gov/healthy-eating-tips/ten-tips.html   My Plate  http://www.choosemyplate.gov/food-groups/   [place] on Fitness, Sports, and Nutrition  http://www.fitness.gov/eat-healthy/quo-kn-jvr-healthy/   Last Reviewed Date   2021-08-09  Consumer Information Use and Disclaimer   This information is not specific medical advice and does not replace information you receive from your health care provider. This is only a brief summary of general information. It does NOT include all information about conditions, illnesses, injuries, tests, procedures, treatments, therapies, discharge instructions or life-style choices that may apply to you. You must talk with your health care provider for complete information about your health and treatment options. This information should not be used to decide whether or not to accept your health care providers advice, instructions or recommendations. Only your health care provider has the knowledge and training to provide advice that is right for you.  Copyright   Copyright © 2021 [employer] and its affiliates and/or licensors. All rights reserved.

## 2024-08-26 DIAGNOSIS — I10 PRIMARY HYPERTENSION: ICD-10-CM

## 2024-08-26 RX ORDER — LOSARTAN POTASSIUM 50 MG/1
50 TABLET ORAL
Qty: 90 TABLET | Refills: 1 | Status: SHIPPED | OUTPATIENT
Start: 2024-08-26

## 2024-09-30 ENCOUNTER — PATIENT MESSAGE (OUTPATIENT)
Dept: FAMILY MEDICINE | Facility: CLINIC | Age: 59
End: 2024-09-30
Payer: COMMERCIAL

## 2024-09-30 DIAGNOSIS — G43.901 MIGRAINE WITH STATUS MIGRAINOSUS, NOT INTRACTABLE, UNSPECIFIED MIGRAINE TYPE: Primary | ICD-10-CM

## 2024-10-02 RX ORDER — SUMATRIPTAN SUCCINATE 100 MG/1
TABLET ORAL
Qty: 15 TABLET | Refills: 7 | Status: SHIPPED | OUTPATIENT
Start: 2024-10-02

## 2024-10-30 DIAGNOSIS — K59.04 CHRONIC IDIOPATHIC CONSTIPATION: ICD-10-CM

## 2024-10-30 DIAGNOSIS — R10.12 ABDOMINAL PAIN, LEFT UPPER QUADRANT: Primary | ICD-10-CM

## 2024-10-30 DIAGNOSIS — M62.89 PELVIC FLOOR DYSFUNCTION: ICD-10-CM

## 2024-10-30 LAB — CRC RECOMMENDATION EXT: NORMAL

## 2024-10-31 ENCOUNTER — PATIENT OUTREACH (OUTPATIENT)
Dept: ADMINISTRATIVE | Facility: HOSPITAL | Age: 59
End: 2024-10-31
Payer: COMMERCIAL

## 2024-11-04 ENCOUNTER — HOSPITAL ENCOUNTER (OUTPATIENT)
Dept: RADIOLOGY | Facility: HOSPITAL | Age: 59
Discharge: HOME | End: 2024-11-04
Attending: INTERNAL MEDICINE
Payer: COMMERCIAL

## 2024-11-04 DIAGNOSIS — K59.04 CHRONIC IDIOPATHIC CONSTIPATION: ICD-10-CM

## 2024-11-04 DIAGNOSIS — M62.89 PELVIC FLOOR DYSFUNCTION: ICD-10-CM

## 2024-11-04 DIAGNOSIS — R10.12 ABDOMINAL PAIN, LEFT UPPER QUADRANT: ICD-10-CM

## 2024-11-04 LAB
CREAT SERPL-MCNC: 0.8 MG/DL (ref 0.5–1.4)
SAMPLE: NORMAL

## 2024-11-04 PROCEDURE — 74177 CT ABD & PELVIS W/CONTRAST: CPT | Mod: TC,PO

## 2024-11-04 PROCEDURE — 74177 CT ABD & PELVIS W/CONTRAST: CPT | Mod: 26,,, | Performed by: RADIOLOGY

## 2024-11-04 PROCEDURE — 25500020 PHARM REV CODE 255: Mod: PO | Performed by: INTERNAL MEDICINE

## 2024-11-04 RX ADMIN — IOHEXOL 100 ML: 350 INJECTION, SOLUTION INTRAVENOUS at 03:11

## 2024-11-11 DIAGNOSIS — K62.89: ICD-10-CM

## 2024-11-11 DIAGNOSIS — K59.04 CHRONIC IDIOPATHIC CONSTIPATION: Primary | ICD-10-CM

## 2024-11-11 DIAGNOSIS — K62.89 EXTERNAL ANAL SPHINCTER, RELAXATION: ICD-10-CM

## 2024-12-05 ENCOUNTER — CLINICAL SUPPORT (OUTPATIENT)
Dept: REHABILITATION | Facility: HOSPITAL | Age: 59
End: 2024-12-05
Attending: NURSE PRACTITIONER
Payer: COMMERCIAL

## 2024-12-05 DIAGNOSIS — M62.89 PELVIC FLOOR DYSFUNCTION: Primary | ICD-10-CM

## 2024-12-05 PROCEDURE — 97530 THERAPEUTIC ACTIVITIES: CPT | Mod: PO

## 2024-12-05 PROCEDURE — 97112 NEUROMUSCULAR REEDUCATION: CPT | Mod: PO

## 2024-12-05 PROCEDURE — 97161 PT EVAL LOW COMPLEX 20 MIN: CPT | Mod: PO

## 2024-12-05 NOTE — PATIENT INSTRUCTIONS
"Home Exercise Program: 12/05/2024    DIAPHRAGMATIC BREATHING     The diaphragm is a dome shaped muscle that forms the floor of the rib cage. It is the most efficient muscle for breathing and relaxation, although most people are not used to using the diaphragm. Diaphragmatic or belly breathing is an important technique to learn because it helps settle down or relax the autonomic nervous system. The correct use of diaphragmatic breathing can help to quiet brain activity resulting in the relaxation of all the muscles and organs of the body. This is accomplished by slow rhythmic breathing concentrated in the diaphragm muscle rather than the chest.    How to do proper relaxation breathing:    Start by lying on your back or reclining in a chair in a relaxed position. Place one hand on your chest and the other on your abdomen.  Relax your jaw by placing your tongue on the bottom of your mouth and keeping your teeth slightly apart.   Take a deep breath in, letting the abdomen expand and rise while you keep your upper chest, neck and shoulders relaxed.   As you breathe out, allow your abdomen and chest to fall. Exhale completely.  It doesn't matter if you breathe in/out through your nose and/or mouth. Do whichever feels comfortable.  Remember to breathe slowly.  Do not force your breathing. Do not hold your breath.  Repeat for 5-10 minutes every day.          "Child's Pose" - first start by getting onto your hands and knees, then move your feet so they are touching and your knees are wider than your hips. Sit back so that you are sitting on your heels and reach your arms forward. Think about resting in this position. Complete 2 sets of 10 breaths.                    Cat/Cow:   - start on hands/knees. You can make fists to protect your wrists.   - deep breath in as you drop you abdomen and create a curve in your back. Think about the pelvic outlet opening and your pelvic floor relaxing   - gently exhale (don't force air out) " "as you reverse the curve by rounding your back and pushing through your fists to extend your shoulders.     Complete 2 sets of 10 breaths      "Happy Baby" - lay on your back. Open your knees slightly wider than your torso, then bring them up toward your armpits. Position each ankle directly over the knee, so your shins are perpendicular to the floor. Flex through the heels. Hold this pose while you incorporate your deep breathing. Complete 2 sets of 10 breaths.      1) Bowel function - consider the following recommendations for optimizing bowel function. Good bowel health is important for overall pelvic floor function.     Adequate fluid intake is necessary for proper bowel function. Goal this week is to drink 64 ounces/day. Try completing your water bottle one time by lunch and second bottle by dinner time.     Fiber adds bulk to stool and promotes more frequent and regular bowel movements. Strategies to increase fiber intake:  One cup of high fiber cereal every day.  1/2 to 1 cup of prune juice or several stewed prunes every day, followed by a cup of hot water or tea.   2 cups of fruit or 2 1/2 cups vegetables, 6-8 oz high fiber grains daily  Fiber supplements, I.e. Metamucil, Citrucel, Konsyl, Benefiber  2 tablespoons of flax seed meal (can be added to cereal, smoothies, yogurt, oatmeal, etc)     Positioning and techniques for elimination     Bowel Movement Mechanics  1. Sit on the toilet comfortably with legs and buttocks relaxed.  2. Put your feet on a waste basket or squatty potty  3. Lean forward while keeping your back straight, forearms rest on knees.  4. Keep your knees apart.  5. Fully relax pelvic floor muscles - think about softening, opening, dropping  6. Use gentle belly breaths and bulge lower abdominals like making a beer belly  7. Keep belly big on exhalation  8. Exhale like blowing out birthday candles  9. Keep breathing like this through entire bowel movement  10. Make sure to give enough time, " "ok for it to take several minutes     Do not strain and Do not hold your breath.       (Search "Squatty Potty" on [employer])       Bowel Massage -  Try performing this massage to your abdomen every morning before getting out of bed. Try to do it for 3-5 minutes. Follow with some nice deep breaths and drink a glass of warm water afterwards.      FIBER  Supplemental Fiber: Benefiber, Metamucil, Konsyl, Citrucel, Perla's Tummy Fiber   - Increase supplemental fiber every 2-3 days to assess for affects   - Titrate up until desire stool caliber is achieved   - You can then substitute dietary fibers for supplemental fiber once desired caliber is achieved  Increase dietary intake to 20-30 grams/day    Increasing Fiber in Diet  There are two kinds of fiber. Soluble fiber dissolves in water and when included in a diet low in total fat, can help lower blood cholesterol. (Sources: fruit, vegetables, barley, legumes, oats, and oat bran).  Insoluble fiber is used to help promote bowel regularity and may help reduce the risk of some forms of cancer.  (Sources: fruit, vegetables, cereals, whole-wheat products, and bran).     The recommended fiber intake in children age 3-18 is age plus five. Example: A six year old child would need age 6 + 5 = 11 grams of fiber per day. Adding fiber to your childs diet may be a challenge. Below are some ways to add fiber to meals:     Offer baked goods containing whole grains like oatmeal cookies and bran muffins. Add chopped fruit to muffins, quick breads and pancakes.  For breakfast serve whole wheat breads and whole grain cereals. Look for cereals that contain at least 5 grams of fiber per serving and breads that contain at least 2 grams of fiber per slice.  Substitute whole-wheat flour for ¼ to ½ of white flour in recipes.  For high fiber snacks, serve popcorn, whole-wheat pretzels, or a trail mix consisting of dried fruits, unsalted nuts and bran cereals.  Add beans, split peas, lentils and " whole grains to salads, soups, stews and casseroles. Serve chili, baked beans, burritos and other bean dishes.  Add pureed beans to ground meat dishes and casseroles  For desserts and snacks, serve fresh, dried or stewed fruit.  Add bran cereal into hamburgers, meatloaf, chili, meatballs and casseroles.  Score the apple until it is striped for more child appeal.  Spread crunch peanut butter on apple slices or bananas.   Make fruit kabobs on Popsicle sticks.  Dip fruit in yogurt then nuts or favorite whole-grain cereal.   Try raw veggies and dip or use bean dip for raw vegetables.  Do not remove the peel on fruits and vegetables.   Add chopped celery, carrots, green pepper, etc. to tuna, chicken and other salads.  When making potato salad, do not peel the potatoes.  Make French fries, hash brown, etc from unpeeled potatoes.   Spread crunchy peanut butter on celery slices and top with raisins.  Make veggie kabobs on Popsicle sticks.  Top yogurt, frozen yogurt, or ice cream with granola, nuts, or favorite high fiber cereal.  Place frozen yogurt between two oatmeal cookies.  Use Bean dip for raw vegetables.  Mix high fiber and low fiber cereals together such as Apple Jacks and Bran Chex.  Make sandwiches using 1 slice whole wheat bread and 1 slice white bread.  Make quesadillas with cheese and beans on whole-wheat tortillas.  Top pancakes, waffles or French toast with berries and nuts.  Mix white and brown rice.  Add fresh vegetables to a wild rice or whole-wheat pasta salad.  Top Bereket Crackers or Cookies with seeded preserves.  Make desserts using crunchy peanut butter.  Make Rice Krispie treats with peanuts using other high fiber cereals.  Make popcorn balls with added dried fruit or nuts.  Make trail mix using high-fiber ingredients.     Fiber Content of Foods in Grams  Fruit  Apple 1 medium with skin  2.2  Banana 1 medium   2  Cherries. 20 each   2  Cantaloupe 1 ½ cups   2  Nectarine. 1 medium   2  Orange.1  medium   2  Peach, raw 1 ½   2  Pear, raw 1 medium   2  Prunes, stewed  3 oz   6.6  Raisins 3 oz    5.3  Strawberries 1 cup   2.8  Fruit-filled cereal bar 1  2     Vegetables  Broccoli, cooked ½ cup  2.8  Carrots, raw 1 medium  2.2  Cauliflower, cooked ½ cup  2.4  Celery 3-8 stalks   2  Corn, cooked ½ cup   2.3  Corn on Cob 5 ½ inch   2  Green Beans, cooked ½ cup  2  Green Peas, cooked ½ cup  4.4  Potato with skin 1 medium  4.8  Spinach, cooked ½ cup  2.2  Squash, ¾ cup   2  Sweet Potato, ¼-½ cup  2  Tomato, 1¼ medium   2     Dairy  Fruit Yogurt, 8 oz   1     Legumes  Beans, black ½ cup   3.6  Beans, baked  ½ cup   3.6  Beans, kidney ½ cup   3.2  Beans, baby jennifer ½ cup  3.9  Beans, garbanzo 6 Tbsp  2  Beans, refried, 5 Tbsp  2  Chili with Beans ¼ cup  2  Lentils, ½ cup    4     Nuts & Seeds  Almonds, 1 oz (approx 23 nuts) 3  Cashews, 21 each   3  Peanuts, 1 oz    2.3  Pistachios, 1 oz (approx 49 nuts) 2  Sunflower Seeds, 1 oz  2.8  Emerson Halves, 1 oz   1.4  Crunchy Peanut Butter, 4 Tbsp 2  Rick seeds, 1 oz   10  Ground flax seeds, 1 oz  2  Sesame seeds, 1 oz   1  Hemp seeds whole, 1 oz  1  Pumpkin seeds, 1 oz   1    Cereals  All Bran ½ cup    10  Bran Chex, ½ cup   5  Bran Flakes, ¾ cup   3.9  CracklinOat Bran ½ cup  3.5  Cherrios, 2/3 cup   2  Fiber One, ¼ cup   6.5  Fruit n Fiber ½ cup   5  Frosted Mini Wheats  1/3 cup  2  Grape Nuts, ¼ cup   2  Granola, ¼ cup   2  Go Lean Crunch ½ cup  4  Mini Wheats with raisins, ¾ cup 5  Oatmeal, ¾ cup   3  Raisin Bran 1/3 cup   2  Shredded Wheat, 2/3 cup  2.8  Wheat Chex, ½ cup   2.5  100% Bran. 1/3 cup   8    Grains  Brown rice, ½ cup cooked  2  Cornbread, 2 square   2  Corn tortilla, 1 each   1  Wheat germ, ¼ cup   3.8  Whole grain/seeded bagel 1 each 2  Whole grain bread, 1 slice  2  Whole-grain crackers, 1-4  2  Whole grain muffin, 1 each  1  Whole-wheat spaghetti, ½ cup 3.2  Whole-wheat tortilla, each  4  Whole grain frozen waffle, 1 each 2     Desserts  Berry pie,  2 slices   2  Berry cobbler 8 oz    2  Fig or fruit bars cookies 2 each 2  Bereket Crackers, 4 squares  2  Oatmeal raisin cookies, 4 each  2  Peanut butter cookie/nuts, 2  2  Whole grain fruit bars, 1 each  1     Snacks  Popcorn, 3½ cup air popped   4.2  Whole grain pretzels, 2 oz  2

## 2024-12-05 NOTE — PLAN OF CARE
[facility]   Physical Therapy Initial Evaluation        Date: 2024   Name: Angel BRUCE [facility] Number: [id number]    Therapy Diagnosis:   Encounter Diagnosis   Name Primary?    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 She had a colonoscopy and anorectal manometry that shows muscle weakness and increased muscle activity with defecation. She does get a rectal pain for 10-15 seconds occasionally.  Does spin class 3x/week. She also does machines 3x/week as well as 30 minutes of the elliptical.     OB/GYN History:    vaginal delivery and episiotomy- may have had more tearing past the episiotomy   Hysterectomy? No  Oophorectomy? No  Using vaginal estrogen cream: Yes  Sexually active? Yes, sometimes pain with deeper penetration   Pelvic Pain with: with intercourse  Pelvic pressure? Some lower abdominal pressure - may feel like constipation or menstrual cramps     Bladder/Bowel History:   Frequency of urination:   Daytime: more often in the morning with coffee. Every 1-2 hours            Nighttime: 1x  Difficulty initiating urine stream: No  Urine stream: strong  Complete emptying: Yes  Bladder leakage: Yes  Activities that cause leakage: UUI, sometimes KAJAL   Frequency of incidents: daily - mostly in the morning   Amount leaked  (urine): few drops and teaspoon(s)  Urinary Urgency: Yes.  Able to delay the urge for at least 1-5 minute(s).  Pain with delaying the urge to urinate: No     Frequency of bowel movements: not daily;  every 2-3 days without medication  Difficulty initiating BM: Yes  Quality/Shape of BM: [place] Stool Chart type 1-2 without medication  Does Patient Feel Empty after BM? No  Bowel Urgency: No.  Able to delay the urge for at least 15 minute(s).  Fiber Supplements or Laxative Use? Miralax and trying Linzess as well as a few others    Pain with BM: Yes .   History of hemorrhoids/anal fissures?  Yes- small internal   Bleeding with BM: No , has happened in the past- minimal  Colon leakage: No    Form of protection: none reported  Number of pads required in 24 hours: 0     Types of fluid intake: Water: 32 ounces x 3/day; 2.5 cups of coffee in the am   Diet: usually eats pretty good. Fiber in her diet. Eggs maybe once per week. Fruits/veggies daily.    Habitus: well developed, well nourished  Abuse/Neglect: Pt denies a history of physical or emotional abuse at this visit.       Pain:  Location: none reported      Medical History: Angel  has a past medical history of Insomnia, idiopathic, Migraines, Personal history of adenomatous and serrated colon polyps (10/30/2024), PONV (postoperative nausea and vomiting), and Vitamin D deficiency.     Surgical History: Angel Penaloza  has a past surgical history that includes Colonoscopy; Mouth surgery; Hernia repair; and Colonoscopy (10/30/2024).    Medications: Angel has a current medication list which includes the following prescription(s): carbamide peroxide, cholecalciferol (vitamin d3), cyanocobalamin, estradiol, fluticasone propionate, losartan, multivitamin, progesterone, sumatriptan, and valacyclovir.    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  Current Level of Function: Bowel and urinary function impacting ADLs and iADLs.     Constitutional Symptoms Review: Weight loss - not sure if this is the scale or if it is less muscle mass     Flags Screening  Red Flags:personal history of cancer: denies      Pts goals: improve bowel regularity     OBJECTIVE     See EMR under MEDIA for written consent provided 12/5/2024  Chaperone: deferred for follow up visit    ORTHO SCREEN  Posture in sitting: WNL  Posture in standing: decreased lordosis  Pelvic alignment: Not assessed today      Gait Analysis: no significant deviations observable       Balance Reactions:    Static standing: excellent   Dynamic standing:  excellent       ABDOMINAL WALL ASSESSMENT  Palpation: tender and increased tension  at ascending and descending colon,  Abdominal strength: Rectus abdominus: 5/5     Transverse abdominus: oblique compensations noted with fair tension generation at lower abdominal wall  Scarring: none observed  Pelvic Floor Muscle and Transverse Abdominus Synergy: present  Diastasis: absent     BREATHING MECHANICS ASSESSMENT   Thorax Assessment During Quiet Respiration: Decreased excursion of abdominal wall   Thorax Assessment During Deep Respiration: paradoxical (abdominal wall contracts during inhalation) significant oblique activation, poor eccentric lengthening of pelvic floor     VAGINAL PELVIC FLOOR EXAM    Deferred secondary to time constraints     Intake Outcome Measures for FOTO Survey    Therapist reviewed FOTO scores for Angel TEO Penaloza on 12/5/2024.     FOTO report- see Media section in Epic or  account episode details in FOTO.      Intake Score:   Provided, not completed          TREATMENT        Treatment Time In: 8:48 am  Treatment Time Out: 9:20 am  Total Treatment time (time-based codes) separate from Evaluation: 32 minutes    Neuromuscular Re-education to develop Coordination, Control, Down training, Proprioception, and Sense for 8 minutes including:   [x]adbominal bracing and bearing down with abdominal release     Therapeutic Activity Patient participated in dynamic functional therapeutic activities to improve functional performance for 24 minutes. Including: Education as described below.     [x] bladder irritants, anatomy/physiology of pelvic floor, posture/body mechanices, diaphragmatic breathing, proper bearing down techniques, fluid intake/dietary modifications, and behavior modifications   [x] Instruction on diaphragmatic breathing   [x] Education on visual cues/mental imagery for pelvic floor relaxation  [x] Education on visual cues/mental imagery for pelvic floor activation   [] Instruction on the Knack for reduction of stress urinary incontinence  [x] Pelvic anatomy edu and impact on current level of function   [x] HEP building      Written Home Exercises and Education Provided: yes. Exercises were reviewed and Angel was able to demonstrate them prior to the end of the session.  Angel demonstrated good  understanding of the education provided. See EMR under Patient Instructions for exercises and education provided during therapy sessions.      ASSESSMENT     Angel is a 59 y.o. 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 Pt presents with decreased phasic ability of the pelvic muscles, increased tension of the pelvic muscles, poor quality of pelvic muscle contraction, increased frequency of urination, increased  "nocturia, poor coordination of pelvic floor muscles during ADL's leading to urinary or fecal leakage, dysfunctional defecation, and unable to co-contract or co-relax abdominal wall and pelvic floor muscles. Upon assessment, patient demonstrates significant coordination deficits  with poor eccentric lengthening of abdominal wall required for defecation.     Pt prognosis is Good.   Pt will benefit from skilled outpatient Physical Therapy to address the deficits stated above and in the chart below, provide pt/family education, and to maximize pt's level of independence.     Plan of care discussed with patient: Yes  Pt's spiritual, cultural and educational needs considered and patient is agreeable to the plan of care and goals as stated below:     Anticipated Barriers for therapy: scheduling    Medical Necessity is demonstrated by the following  History  Co-morbidities and personal factors that may impact the plan of care [x] LOW: no personal factors / co-morbidities  [] MODERATE: 1-2 personal factors / co-morbidities  [] HIGH: 3+ personal factors / co-morbidities    Moderate / High Support Documentation:   Co-morbidities affecting plan of care:     Personal Factors:   no deficits     Examination  Body Structures and Functions, activity limitations and participation restrictions that may impact the plan of care [x] LOW: addressing 1-2 elements  [] MODERATE: 3+ elements  [] HIGH: 4+ elements (please support below)    Moderate / High Support Documentation: low back pain     Clinical Presentation [x] LOW: stable  [] MODERATE: Evolving  [] HIGH: Unstable     Decision Making/ Complexity Score: low         Goals:  Short Term Goals: 4 weeks   Pt to be edu pelvic muscle bracing and be able to consistently perform correctly and quickly to help decrease incontinence with cough/laugh/sneeze.  Pt to perform "the knack" prior to coughing, laughing or sneezing to decrease risk of incontinence.  Patient to demonstrate proper use of " urge delay strategies to help reduce urge urinary incontinence with activities of daily living.   Pt to demonstrate proper positioning on commode with breathing techniques to decrease strain with BM to enable pt to feel empty after BM.   Pt to demonstrate independence with performing bowel massage to help with gut motility.   Pt to be able to bulge pelvic floor which is needed for comfortable BM and complete evacuation.       Long Term Goals: 12 weeks ,   Pt to be discharged with home plan for carry over after discharge.    Pt to report an 70% reduction of urinary incontinence symptoms with ADL participation thereby demonstrating improved pelvic floor muscle control and strength.   Pt to report being able to have a BM without straining 70% of the time to demonstrate improving PF coordination.   Pt to demonstrate an improved score in the FOTO Bowel cnst survey  to at least (to be updated upon completion of FOTO in follow up visit) to demonstrate improving bowel regularity and function.        PLAN     Plan of care Certification: 12/5/2024 to 2/26/2025.    Outpatient Physical Therapy 1-2 times weekly for 12 weeks to include the following interventions: therapeutic exercises, therapeutic activity, neuromuscular re-education, gait training, manual therapy, modalities PRN, patient/family education, and self care/home management, and  dry needling.     Batsheva Parks, PT

## 2024-12-08 ENCOUNTER — PATIENT MESSAGE (OUTPATIENT)
Dept: FAMILY MEDICINE | Facility: CLINIC | Age: 59
End: 2024-12-08
Payer: COMMERCIAL

## 2024-12-09 ENCOUNTER — CLINICAL SUPPORT (OUTPATIENT)
Dept: REHABILITATION | Facility: HOSPITAL | Age: 59
End: 2024-12-09
Attending: NURSE PRACTITIONER
Payer: COMMERCIAL

## 2024-12-09 DIAGNOSIS — M62.89 PELVIC FLOOR DYSFUNCTION: Primary | ICD-10-CM

## 2024-12-09 PROCEDURE — 97112 NEUROMUSCULAR REEDUCATION: CPT | Mod: PO

## 2024-12-09 PROCEDURE — 97140 MANUAL THERAPY 1/> REGIONS: CPT | Mod: PO

## 2024-12-09 PROCEDURE — 97530 THERAPEUTIC ACTIVITIES: CPT | Mod: PO

## 2024-12-09 NOTE — PATIENT INSTRUCTIONS
Splinting:  Splinting is a technique to help you move stool out of the rectum when you have a rectocele or difficulty emptying.   Technique: Insert a lubricated or unlubricated finger, tool, or tampon applicator into the vagina and press back against the rectum. You can also try pressing outside the body at the perineum, the area between the vagina and anus.Use your bearing down guide to attempt having a bowel movement. NO breath holding!

## 2024-12-09 NOTE — PROGRESS NOTES
[facility]   Physical Therapy Treatment Note      Name: Angel Penaloza  [facility] Number: [id number]    Therapy Diagnosis:   Encounter Diagnosis   Name Primary?    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 Is having hard bowel movements almost every day now.    She was compliant with home exercise program.  Response to previous treatment: no adverse reaction  Functional change: improving bowel function    Pain: none reported    Constitutional Symptoms Review: The patient denies having any constitutional symptoms.     Pts goals: improve bowel regularity     Objective      Objective Measures updated at progress report unless specified.     VAGINAL PELVIC FLOOR EXAM    EXTERNAL ASSESSMENT  Introitus: WNL  Skin condition: WNL  Scarring: old perineal scarring noted   Sensation: WNL   Pain:  right layer 1 and sacrotuberous ligament tenderness  Voluntary contraction: visible lift  Voluntary relaxation: visible drop  Involuntary contraction: visible drop  Bearing down: visible drop  Perineal descent: absent      INTERNAL ASSESSMENT  Pain: symptoms of excessive pressure with light palpation   Sensation: able to localized pressure appropriately   Vaginal vault: stenotic   Muscle Bulk: hypertonus   Muscle Power: 1/5  Muscle Endurance: 5 sec  Quality of  contraction: decreased hold, slow relaxation, and incomplete relaxation   Specificity: patient contracts: minimal glutes   Coordination: tends to hold breath during PFM contration     See EMR under MEDIA for written consent provided 12/5/2024  Chaperone: deferred for follow up visit    Treatment   Florin received the treatments listed below:    Pt verbally consents to intra-vaginal treatment today.  Signed consent form already on file.  Chaperone declined today.        Florin received the following manual therapy techniques: to develop flexibility, extensibility, and desensitization for 15 minutes including:     colon massage, abdominal shearing      Florin participated in neuromuscular re-education activities to develop Coordination, Control, Down training, Posture, Proprioception, and Sense for 25 minutes including:     [x]pelvic floor relaxation/bulging training, pelvic floor relaxation speed after performing a kegel, bearing down with abdominal release, and diaphragmatic breathing   [x]Pelvic assessment as noted above to address muscle coordination, control, proprioception, and down training   [x] Kegel edu and practice.  Increased time spent on edu on proper technique.  Pt improves with practice and verbal cues.      Florin participated in dynamic functional therapeutic activities to improve functional performance for 10  minutes, including:  [x] Splinting education and review  [x] Plan of care review  [x] Anatomy review and discussion of the anatomy on current level of function   [] Education on visual cues/mental imagery for pelvic floor relaxation  [] Education on visual cues/mental imagery for pelvic floor activation  [] Coordination of kegels with functional activities such as cough, laugh, sneeze, lift, etc.   [] Pt edu in the Roll for Control technique to decrease incontinence with strong urge.  Practiced new technique in sitting and standing.    [x] Home exercise program issued and reviewed      Home Exercises Provided and Patient Education Provided     Education provided: See above  Discussed progression of plan of care with patient; educated pt in activity modification; reviewed HEP with pt. Pt demonstrated and verbalized understanding of all instruction and was provided with a handout of HEP (see Patient Instructions).    Written Home Exercises Provided: yes.  Exercises were reviewed and Angel was able to demonstrate them prior to the end of the session.  Angel demonstrated good  understanding of the education provided.     See EMR under Patient Instructions for exercises provided 12/9/2024.    Assessment     Angel tolerated pelvic assessment today with greatest difficulty bearing down at the lower 1/3 of the vaginal canal. Education on self assessment and splinting to further improve proprioception and awareness of incorrect muscle activation when defecating. Patient may benefit from use of biofeedback in her follow up visit for visualization of pelvic floor muscle activity and down training.  Pt will continue to benefit from skilled outpatient physical therapy to address the deficits listed in the problem list box on initial evaluation, provide pt/family education and to maximize pt's level of independence in the home and community environment.       Angel Is progressing well towards her goals.   Pt prognosis is Good.     Pt will continue to benefit from skilled outpatient physical therapy to address the deficits listed in the problem list box on initial evaluation, provide pt/family education and to maximize pt's level of independence in the home and community environment.     Pt's spiritual, cultural and educational needs considered and pt agreeable to plan of care and goals.     Anticipated barriers to physical therapy: None    Goals: (PROGRESSING 12/9/2024 )  Short Term Goals: 4 weeks   Pt to be edu pelvic muscle bracing and be able to consistently perform correctly and quickly to  "help decrease incontinence with cough/laugh/sneeze.  Pt to perform "the knack" prior to coughing, laughing or sneezing to decrease risk of incontinence.  Patient to demonstrate proper use of urge delay strategies to help reduce urge urinary incontinence with activities of daily living.   Pt to demonstrate proper positioning on commode with breathing techniques to decrease strain with BM to enable pt to feel empty after BM.   Pt to demonstrate independence with performing bowel massage to help with gut motility.   Pt to be able to bulge pelvic floor which is needed for comfortable BM and complete evacuation.         Long Term Goals: 12 weeks ,   Pt to be discharged with home plan for carry over after discharge.    Pt to report an 70% reduction of urinary incontinence symptoms with ADL participation thereby demonstrating improved pelvic floor muscle control and strength.   Pt to report being able to have a BM without straining 70% of the time to demonstrate improving PF coordination.   Pt to demonstrate an improved score in the FOTO Bowel cnst survey  to at least (to be updated upon completion of FOTO in follow up visit) to demonstrate improving bowel regularity and function.      Plan   Plan of care Certification: 12/5/2024 to 2/26/2025.    Continue with established Plan of Care, working toward established PT goals.      At next visit: consider biofeedback, review splinting    Batsheva Parks, PT     "

## 2024-12-19 ENCOUNTER — CLINICAL SUPPORT (OUTPATIENT)
Dept: REHABILITATION | Facility: HOSPITAL | Age: 59
End: 2024-12-19
Payer: COMMERCIAL

## 2024-12-19 DIAGNOSIS — M62.89 PELVIC FLOOR DYSFUNCTION: Primary | ICD-10-CM

## 2024-12-19 PROCEDURE — 97112 NEUROMUSCULAR REEDUCATION: CPT | Mod: PO

## 2024-12-19 NOTE — PROGRESS NOTES
[facility]   Physical Therapy Treatment Note      Name: Angel Penaloza  [facility] Number: [id number]    Therapy Diagnosis:   Encounter Diagnosis   Name Primary?    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 The constipation has been a lot better. Eating raisins or prunes more now, less processed foods. More fiber overall and this is helpful. She has cut back on B12 and vitamin . Bowel movements are now softer. She has also tried the splinting that helps. She is having bowel movements just a bout daily at this point.     She was compliant with home exercise program.  Response to previous treatment: no adverse reaction  Functional change: improving bowel function    Pain: none reported    Constitutional Symptoms Review: The patient denies having any constitutional symptoms.     Pts goals: improve bowel regularity     Objective      Objective Measures updated at progress report unless specified.       SEMG EVALUATION:   PF Electrode placement: external perianal  Pt. Position: supine hooklying    SEMG Finding: elevated baseline, poor holding ability, slow return to baseline, and elevated baseline between contractions  No skin irritation, erythema, or other adverse effects observed from electrode placement.    Treatment: Worked on pelvic floor muscle down  training and coordination using sEMG assist.      VAGINAL PELVIC FLOOR EXAM    EXTERNAL ASSESSMENT  Introitus: WNL  Skin condition: WNL  Scarring: old perineal scarring noted   Sensation: WNL   Pain:  right layer 1 and sacrotuberous ligament tenderness  Voluntary contraction: visible lift  Voluntary relaxation: visible drop  Involuntary contraction: visible drop  Bearing down: visible drop  Perineal descent: absent      INTERNAL ASSESSMENT  Pain: symptoms of excessive pressure with light palpation   Sensation: able to localized pressure appropriately   Vaginal vault: stenotic   Muscle Bulk: hypertonus   Muscle Power: 1/5  Muscle Endurance: 5 sec  Quality of contraction: decreased hold, slow relaxation, and incomplete relaxation   Specificity: patient contracts: minimal glutes   Coordination: tends to hold breath during PFM contration     See EMR under MEDIA for written consent provided 12/5/2024  Chaperone: deferred for follow up visit    Treatment   Florin received the treatments listed below:    Pt verbally consents to intra-vaginal treatment today.  Signed consent form already on file.  Chaperone declined today.        Florin received the following manual therapy techniques: to develop flexibility, extensibility, and desensitization for 0 minutes including:     colon massage, abdominal shearing      Florin participated in neuromuscular re-education activities to develop Coordination, Control, Down training, Posture, Proprioception, and Sense for 45 minutes including:     [x]pelvic floor relaxation/bulging training, pelvic floor relaxation speed after performing a kegel, bearing down with abdominal release, and diaphragmatic breathing   []Pelvic assessment as noted above to address muscle coordination, control, proprioception, and down training   [x] Kegel edu and practice.  Increased time spent on edu on proper technique.  Pt improves with practice and verbal cues.  [x] Biofeedback training for  proprioception, bearing down, and improved efficiency with relaxation time    Angel participated in dynamic functional therapeutic activities to improve functional performance for 0  minutes, including:  [x] Splinting education and review  [x] Plan of care review  [x] Anatomy review and discussion of the anatomy on current level of function   [] Education on visual cues/mental imagery for pelvic floor relaxation  [] Education on visual cues/mental imagery for pelvic floor activation  [] Coordination of kegels with functional activities such as cough, laugh, sneeze, lift, etc.   [] Pt edu in the Roll for Control technique to decrease incontinence with strong urge.  Practiced new technique in sitting and standing.    [x] Home exercise program issued and reviewed     Home Exercises Provided and Patient Education Provided     Education provided: See above  Discussed progression of plan of care with patient; educated pt in activity modification; reviewed HEP with pt. Pt demonstrated and verbalized understanding of all instruction and was provided with a handout of HEP (see Patient Instructions).    Written Home Exercises Provided: yes.  Exercises were reviewed and Angel was able to demonstrate them prior to the end of the session.  Angel demonstrated good  understanding of the education provided.     See EMR under Patient Instructions for exercises provided 12/9/2024.    Assessment     Angel responded well to biofeedback today with evidence of pelvic muscle fatigue and endurance deficits secondary to elevated baseline tone. Additionally, significant difficulty with bearing down that improved 60% by the end of today's session. Pt will continue to benefit from skilled outpatient physical therapy to address the deficits listed in the problem list box on initial evaluation, provide pt/family education and to maximize pt's level of independence in the home and community environment.       Angel Is progressing  "well towards her goals.   Pt prognosis is Good.     Pt will continue to benefit from skilled outpatient physical therapy to address the deficits listed in the problem list box on initial evaluation, provide pt/family education and to maximize pt's level of independence in the home and community environment.     Pt's spiritual, cultural and educational needs considered and pt agreeable to plan of care and goals.     Anticipated barriers to physical therapy: None    Goals: (PROGRESSING 12/19/2024 )  Short Term Goals: 4 weeks   Pt to be edu pelvic muscle bracing and be able to consistently perform correctly and quickly to help decrease incontinence with cough/laugh/sneeze.  Pt to perform "the knack" prior to coughing, laughing or sneezing to decrease risk of incontinence.  Patient to demonstrate proper use of urge delay strategies to help reduce urge urinary incontinence with activities of daily living.   Pt to demonstrate proper positioning on commode with breathing techniques to decrease strain with BM to enable pt to feel empty after BM.   Pt to demonstrate independence with performing bowel massage to help with gut motility.   Pt to be able to bulge pelvic floor which is needed for comfortable BM and complete evacuation.         Long Term Goals: 12 weeks ,   Pt to be discharged with home plan for carry over after discharge.    Pt to report an 70% reduction of urinary incontinence symptoms with ADL participation thereby demonstrating improved pelvic floor muscle control and strength.   Pt to report being able to have a BM without straining 70% of the time to demonstrate improving PF coordination.   Pt to demonstrate an improved score in the FOTO Bowel cnst survey  to at least (to be updated upon completion of FOTO in follow up visit) to demonstrate improving bowel regularity and function.      Plan   Plan of care Certification: 12/5/2024 to 2/26/2025.    Continue with established Plan of Care, working toward " established PT goals.      At next visit: consider biofeedback, review splinting    Batsheva Parks, PT

## 2024-12-24 ENCOUNTER — CLINICAL SUPPORT (OUTPATIENT)
Dept: REHABILITATION | Facility: HOSPITAL | Age: 59
End: 2024-12-24
Payer: COMMERCIAL

## 2024-12-24 DIAGNOSIS — M62.89 PELVIC FLOOR DYSFUNCTION: Primary | ICD-10-CM

## 2024-12-24 PROCEDURE — 97530 THERAPEUTIC ACTIVITIES: CPT | Mod: PO

## 2024-12-24 PROCEDURE — 97140 MANUAL THERAPY 1/> REGIONS: CPT | Mod: PO

## 2024-12-24 PROCEDURE — 97112 NEUROMUSCULAR REEDUCATION: CPT | Mod: PO

## 2024-12-24 NOTE — PATIENT INSTRUCTIONS
I want you to also self-assess 3-4 times before you come back to get a better idea of muscle relaxation and activation. Perform your layer activation and RELAXATION!!      ([email or web address])    Initially it may be useful to practice squeezing at different layers of your pelvic floor muscles to help you find them, as each layer plays an important role in pelvic floor function. Eventually you want to be doing your kegel contractions to include squeezing at all 3 layers, and this should become one smooth movement.    To isolate layer 1: think about closing your openings (around vagina and anus) and nodding the clitoris    To isolate layer 2: imagine a drawstring in your urethra that you are pulling up inside or that you are telescoping the urethra in on itself.     To isolate layer 3: pull your tailbone up and forward towards your pubic bone    When putting it all together, it can be helpful to think about closing your openings and lifting up and in.      So when practicing this at home:   1. Inhale and let the muscles relax   2. Exhale and perform a kegel (closing your openings and lifting up and in) - if you are doing your self assessment, you should feel the muscles close or wrap around the finger and draw up and in as you kegel.   3. Hold for 5 seconds without holding your breath   4. Inhale and release. Drop these muscles away fully before repeating   5. Perform 2-3x10 spread throughout the day

## 2024-12-24 NOTE — PROGRESS NOTES
[facility]   Physical Therapy Treatment Note      Name: Angel Penaloza  [facility] Number: [id number]    Therapy Diagnosis:   Encounter Diagnosis   Name Primary?    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 Stool has been much softer at this point.     She was compliant with home exercise program.  Response to previous treatment: no adverse reaction  Functional change: improving bowel function    Pain: none reported    Constitutional Symptoms Review: The patient denies having any constitutional symptoms.     Pts goals: improve bowel regularity     Objective      Objective Measures updated at progress report unless specified.     RUSI ASSESSMENT 12/24/2024   Transabdominal pelvic floor muscles: poor superior excursion of pelvic floor with attempted Kegel. Notable tension at pelvic floor causing frowny face appearance of inferior bladder wall which may explain increased urgency and poor bladder/bowel elimination.        SEMG EVALUATION: prior date- not working on 12/24/24  PF Electrode placement: external perianal  Pt. Position: supine hooklying    SEMG Finding: elevated baseline, poor holding ability, slow return to baseline, and elevated baseline between contractions  No skin irritation, erythema, or other adverse effects observed from electrode  placement.    Treatment: Worked on pelvic floor muscle down training and coordination using sEMG assist.      VAGINAL PELVIC FLOOR EXAM- all findings maintained with moderate improvements in muscle tension    EXTERNAL ASSESSMENT  Introitus: WNL  Skin condition: WNL  Scarring: old perineal scarring noted   Sensation: WNL   Pain:  right layer 1 and sacrotuberous ligament tenderness  Voluntary contraction: visible lift  Voluntary relaxation: visible drop  Involuntary contraction: visible drop  Bearing down: visible drop  Perineal descent: absent      INTERNAL ASSESSMENT  Pain: symptoms of excessive pressure with light palpation   Sensation: able to localized pressure appropriately   Vaginal vault: stenotic   Muscle Bulk: hypertonus   Muscle Power: 1/5  Muscle Endurance: 5 sec  Quality of contraction: decreased hold, slow relaxation, and incomplete relaxation   Specificity: patient contracts: minimal glutes   Coordination: tends to hold breath during PFM contration     See EMR under MEDIA for written consent provided 12/5/2024  Chaperone: deferred for follow up visit    Treatment   Florin received the treatments listed below:    Pt verbally consents to intra-vaginal treatment today.  Signed consent form already on file.  Chaperone declined today.        Florin received the following manual therapy techniques: to develop flexibility, extensibility, and desensitization for 10 minutes including:    Release of B levator ani musculature     Not today:   colon massage, abdominal shearing      Florin participated in neuromuscular re-education activities to develop Coordination, Control, Down training, Posture, Proprioception, and Sense for 45 minutes including:     [x]RUSI for breathing, drops, contractions of the PFM to help pt visualize PFM motion and function.    [x]pelvic floor relaxation/bulging training, pelvic floor relaxation speed after performing a kegel, bearing down with abdominal release, and diaphragmatic  breathing   [x]Pelvic assessment as noted above to address muscle coordination, control, proprioception, and down training   [x] Kegel edu and practice.  Increased time spent on edu on proper technique.  Pt improves with practice and verbal cues.   Worked on layer sequencing for activation and relaxation  [] Biofeedback training for proprioception, bearing down, and improved efficiency with relaxation time    Angel participated in dynamic functional therapeutic activities to improve functional performance for 25  minutes, including:  [x] Splinting education and review; review of self assessment for muscle activation and relaxation  [x] Plan of care review  [x] Anatomy review and discussion of the anatomy on current level of function   [] Education on visual cues/mental imagery for pelvic floor relaxation  [] Education on visual cues/mental imagery for pelvic floor activation  [] Coordination of kegels with functional activities such as cough, laugh, sneeze, lift, etc.   [] Pt edu in the Roll for Control technique to decrease incontinence with strong urge.  Practiced new technique in sitting and standing.    [x] Home exercise program issued and reviewed     Home Exercises Provided and Patient Education Provided     Education provided: See above  Discussed progression of plan of care with patient; educated pt in activity modification; reviewed HEP with pt. Pt demonstrated and verbalized understanding of all instruction and was provided with a handout of HEP (see Patient Instructions).    Written Home Exercises Provided: yes.  Exercises were reviewed and Angel was able to demonstrate them prior to the end of the session.  Angel demonstrated good  understanding of the education provided.     See EMR under Patient Instructions for exercises provided 12/9/2024.    Assessment     Angel tolerated RUSI assessment well with notable deficits in pelvic function observed (as described in objective section of note).  "Poor lift of bladder base with difficulty demonstrating muscle relaxation although this did improve moderately by the end of today's session with diaphragmatic breathing. Reviewed layer sequencing for improved proprioception with self- assessment.  Pt will continue to benefit from skilled outpatient physical therapy to address the deficits listed in the problem list box on initial evaluation, provide pt/family education and to maximize pt's level of independence in the home and community environment.       Florin Is progressing well towards her goals.   Pt prognosis is Good.     Pt will continue to benefit from skilled outpatient physical therapy to address the deficits listed in the problem list box on initial evaluation, provide pt/family education and to maximize pt's level of independence in the home and community environment.     Pt's spiritual, cultural and educational needs considered and pt agreeable to plan of care and goals.     Anticipated barriers to physical therapy: None    Goals: (PROGRESSING 12/24/2024 )  Short Term Goals: 4 weeks   Pt to be edu pelvic muscle bracing and be able to consistently perform correctly and quickly to help decrease incontinence with cough/laugh/sneeze.  Pt to perform "the knack" prior to coughing, laughing or sneezing to decrease risk of incontinence.  Patient to demonstrate proper use of urge delay strategies to help reduce urge urinary incontinence with activities of daily living.   Pt to demonstrate proper positioning on commode with breathing techniques to decrease strain with BM to enable pt to feel empty after BM.   Pt to demonstrate independence with performing bowel massage to help with gut motility.   Pt to be able to bulge pelvic floor which is needed for comfortable BM and complete evacuation.         Long Term Goals: 12 weeks ,   Pt to be discharged with home plan for carry over after discharge.    Pt to report an 70% reduction of urinary incontinence symptoms " with ADL participation thereby demonstrating improved pelvic floor muscle control and strength.   Pt to report being able to have a BM without straining 70% of the time to demonstrate improving PF coordination.   Pt to demonstrate an improved score in the FOTO Bowel cnst survey  to at least (to be updated upon completion of FOTO in follow up visit) to demonstrate improving bowel regularity and function.      Plan   Plan of care Certification: 12/5/2024 to 2/26/2025.    Continue with established Plan of Care, working toward established PT goals.      At next visit: biofeedback before discharge    Batsheva Parks, PT

## 2025-01-08 ENCOUNTER — PATIENT MESSAGE (OUTPATIENT)
Dept: FAMILY MEDICINE | Facility: CLINIC | Age: 60
End: 2025-01-08
Payer: COMMERCIAL

## 2025-01-08 DIAGNOSIS — Z13.21 ENCOUNTER FOR VITAMIN DEFICIENCY SCREENING: Primary | ICD-10-CM

## 2025-02-17 ENCOUNTER — TELEPHONE (OUTPATIENT)
Dept: FAMILY MEDICINE | Facility: CLINIC | Age: 60
End: 2025-02-17
Payer: COMMERCIAL

## 2025-02-17 DIAGNOSIS — D53.9 MACROCYTIC ANEMIA: ICD-10-CM

## 2025-02-17 DIAGNOSIS — I10 PRIMARY HYPERTENSION: ICD-10-CM

## 2025-02-17 DIAGNOSIS — E78.5 MILD HYPERLIPIDEMIA: ICD-10-CM

## 2025-02-19 LAB
25(OH)D3+25(OH)D2 SERPL-MCNC: 29.2 NG/ML (ref 30–100)
ALBUMIN SERPL-MCNC: 4.7 G/DL (ref 3.8–4.9)
ALP SERPL-CCNC: 63 IU/L (ref 44–121)
ALT SERPL-CCNC: 13 IU/L (ref 0–32)
APPEARANCE UR: CLEAR
AST SERPL-CCNC: 22 IU/L (ref 0–40)
BASOPHILS # BLD AUTO: 0 X10E3/UL (ref 0–0.2)
BASOPHILS NFR BLD AUTO: 1 %
BILIRUB SERPL-MCNC: 0.4 MG/DL (ref 0–1.2)
BILIRUB UR QL STRIP: NEGATIVE
BUN SERPL-MCNC: 23 MG/DL (ref 6–24)
BUN/CREAT SERPL: 24 (ref 9–23)
CALCIUM SERPL-MCNC: 9.9 MG/DL (ref 8.7–10.2)
CHLORIDE SERPL-SCNC: 103 MMOL/L (ref 96–106)
CHOLEST SERPL-MCNC: 225 MG/DL (ref 100–199)
CO2 SERPL-SCNC: 25 MMOL/L (ref 20–29)
COLOR UR: YELLOW
CREAT SERPL-MCNC: 0.95 MG/DL (ref 0.57–1)
EOSINOPHIL # BLD AUTO: 0.4 X10E3/UL (ref 0–0.4)
EOSINOPHIL NFR BLD AUTO: 7 %
ERYTHROCYTE [DISTWIDTH] IN BLOOD BY AUTOMATED COUNT: 10.9 % (ref 11.7–15.4)
EST. GFR  (NO RACE VARIABLE): 69 ML/MIN/1.73
GLOBULIN SER CALC-MCNC: 2.2 G/DL (ref 1.5–4.5)
GLUCOSE SERPL-MCNC: 93 MG/DL (ref 70–99)
GLUCOSE UR QL STRIP: NEGATIVE
HCT VFR BLD AUTO: 39.1 % (ref 34–46.6)
HDLC SERPL-MCNC: 91 MG/DL
HGB BLD-MCNC: 12.8 G/DL (ref 11.1–15.9)
HGB UR QL STRIP: NEGATIVE
IMM GRANULOCYTES # BLD AUTO: 0 X10E3/UL (ref 0–0.1)
IMM GRANULOCYTES NFR BLD AUTO: 0 %
KETONES UR QL STRIP: NEGATIVE
LDLC SERPL CALC-MCNC: 122 MG/DL (ref 0–99)
LEUKOCYTE ESTERASE UR QL STRIP: NEGATIVE
LYMPHOCYTES # BLD AUTO: 1.9 X10E3/UL (ref 0.7–3.1)
LYMPHOCYTES NFR BLD AUTO: 38 %
MCH RBC QN AUTO: 33.8 PG (ref 26.6–33)
MCHC RBC AUTO-ENTMCNC: 32.7 G/DL (ref 31.5–35.7)
MCV RBC AUTO: 103 FL (ref 79–97)
MICRO URNS: NORMAL
MONOCYTES # BLD AUTO: 0.4 X10E3/UL (ref 0.1–0.9)
MONOCYTES NFR BLD AUTO: 9 %
NEUTROPHILS # BLD AUTO: 2.3 X10E3/UL (ref 1.4–7)
NEUTROPHILS NFR BLD AUTO: 45 %
NITRITE UR QL STRIP: NEGATIVE
PH UR STRIP: 7 [PH] (ref 5–7.5)
PLATELET # BLD AUTO: 233 X10E3/UL (ref 150–450)
POTASSIUM SERPL-SCNC: 4.3 MMOL/L (ref 3.5–5.2)
PROT SERPL-MCNC: 6.9 G/DL (ref 6–8.5)
PROT UR QL STRIP: NEGATIVE
RBC # BLD AUTO: 3.79 X10E6/UL (ref 3.77–5.28)
SODIUM SERPL-SCNC: 138 MMOL/L (ref 134–144)
SP GR UR STRIP: 1.02 (ref 1–1.03)
TRIGL SERPL-MCNC: 69 MG/DL (ref 0–149)
UROBILINOGEN UR STRIP-MCNC: 0.2 MG/DL (ref 0.2–1)
VIT B12 SERPL-MCNC: 705 PG/ML (ref 232–1245)
VLDLC SERPL CALC-MCNC: 12 MG/DL (ref 5–40)
WBC # BLD AUTO: 5 X10E3/UL (ref 3.4–10.8)

## 2025-02-24 ENCOUNTER — OFFICE VISIT (OUTPATIENT)
Dept: FAMILY MEDICINE | Facility: CLINIC | Age: 60
End: 2025-02-24
Payer: COMMERCIAL

## 2025-02-24 VITALS
DIASTOLIC BLOOD PRESSURE: 70 MMHG | OXYGEN SATURATION: 99 % | BODY MASS INDEX: 18.25 KG/M2 | SYSTOLIC BLOOD PRESSURE: 120 MMHG | WEIGHT: 103 LBS | HEIGHT: 63 IN | HEART RATE: 67 BPM | TEMPERATURE: 99 F

## 2025-02-24 DIAGNOSIS — E78.5 MILD HYPERLIPIDEMIA: ICD-10-CM

## 2025-02-24 DIAGNOSIS — E55.9 VITAMIN D INSUFFICIENCY: ICD-10-CM

## 2025-02-24 DIAGNOSIS — B37.0 THRUSH: ICD-10-CM

## 2025-02-24 DIAGNOSIS — I10 PRIMARY HYPERTENSION: Primary | ICD-10-CM

## 2025-02-24 DIAGNOSIS — B00.1 HERPES LABIALIS: ICD-10-CM

## 2025-02-24 DIAGNOSIS — G43.901 MIGRAINE WITH STATUS MIGRAINOSUS, NOT INTRACTABLE, UNSPECIFIED MIGRAINE TYPE: ICD-10-CM

## 2025-02-24 DIAGNOSIS — D53.9 MACROCYTIC ANEMIA: ICD-10-CM

## 2025-02-24 PROCEDURE — 3008F BODY MASS INDEX DOCD: CPT | Mod: CPTII,S$GLB,, | Performed by: NURSE PRACTITIONER

## 2025-02-24 PROCEDURE — 4010F ACE/ARB THERAPY RXD/TAKEN: CPT | Mod: CPTII,S$GLB,, | Performed by: NURSE PRACTITIONER

## 2025-02-24 PROCEDURE — G2211 COMPLEX E/M VISIT ADD ON: HCPCS | Mod: S$GLB,,, | Performed by: NURSE PRACTITIONER

## 2025-02-24 PROCEDURE — 1159F MED LIST DOCD IN RCRD: CPT | Mod: CPTII,S$GLB,, | Performed by: NURSE PRACTITIONER

## 2025-02-24 PROCEDURE — 3074F SYST BP LT 130 MM HG: CPT | Mod: CPTII,S$GLB,, | Performed by: NURSE PRACTITIONER

## 2025-02-24 PROCEDURE — 99214 OFFICE O/P EST MOD 30 MIN: CPT | Mod: S$GLB,,, | Performed by: NURSE PRACTITIONER

## 2025-02-24 PROCEDURE — 1160F RVW MEDS BY RX/DR IN RCRD: CPT | Mod: CPTII,S$GLB,, | Performed by: NURSE PRACTITIONER

## 2025-02-24 PROCEDURE — 99999 PR PBB SHADOW E&M-EST. PATIENT-LVL IV: CPT | Mod: PBBFAC,,, | Performed by: NURSE PRACTITIONER

## 2025-02-24 PROCEDURE — 3078F DIAST BP <80 MM HG: CPT | Mod: CPTII,S$GLB,, | Performed by: NURSE PRACTITIONER

## 2025-02-24 RX ORDER — LOSARTAN POTASSIUM 25 MG/1
25 TABLET ORAL DAILY
Qty: 90 TABLET | Refills: 1 | Status: SHIPPED | OUTPATIENT
Start: 2025-02-24

## 2025-02-24 RX ORDER — POLYETHYLENE GLYCOL 3350 17 G/17G
POWDER, FOR SOLUTION ORAL
COMMUNITY
Start: 2024-11-07

## 2025-02-24 RX ORDER — NYSTATIN 100000 [USP'U]/ML
5 SUSPENSION ORAL 4 TIMES DAILY
Qty: 200 ML | Refills: 0 | Status: SHIPPED | OUTPATIENT
Start: 2025-02-24 | End: 2025-03-06

## 2025-02-24 NOTE — PROGRESS NOTES
Subjective:       Patient ID: Angel Penaloza is a 59 y.o. female.    Chief Complaint: Hypertension and Follow-up    History of Present Illness    CHIEF COMPLAINT:  Patient presents today to discuss recent lab results and for a follow-up on medication changes.    HPI:  Patient has a history of constipation, which led to a colonoscopy. Her gastroenterologist ordered a CT and anorectal manometry. Dietary modifications, including increased fiber intake, have improved her condition. She discontinued vitamin B12 and vitamin D2 supplements prior to this visit, and reduced her estrogen dosage by half on medical advice. She had episodes of dizziness or vertigo when dismounting exercise machines or bending down for yard work, prompting her to reduce losartan from 50mg to 25mg. Her son's recent visit led to poor dietary choices, including a fried shrimp po' boy, which she believes may have affected her cholesterol levels. Currently, she has significant thirst and a white coating on her tongue, identified as a yeast overgrowth. She used a new mouthwash (Crest) recently, which left a metallic taste, before switching back to Listerine. She had fever blisters the previous week, for which she took Valtrex.  She denies recent antibiotic use (other than Valtrex for fever blisters), use of inhaled steroids, and consumption of candies or having sugar sitting in her mouth.    MEDICATIONS:  Patient is on Losartan 25 mg daily for blood pressure management. She is also taking estrogen, with the dosage recently reduced by half. She uses Fluticasone propionate nasal spray occasionally for congestion and takes a multivitamin containing vitamin D formulated for people over 50. Valtrex is used as needed for fever blisters. Patient has discontinued both vitamin B12 and vitamin D2 supplements.    MEDICAL HISTORY:  Patient has a history of macrocytic anemia, constipation, and hypertension.    SURGICAL HISTORY:  Patient recently underwent a  colonoscopy for constipation evaluation.    TEST RESULTS:  Recent lab results show the patient's vitamin D level at 29, which is low (goal is ). Her vitamin B12 level is within the normal range. A recent urine test was normal. The cholesterol panel revealed an LDL of 122, which has increased from the previous test (goal is less than 100). Her HDL is significantly elevated at 91 (goal is above 50). The metabolic panel showed an improved BUN creatinine ratio, one point above the normal of 23. Liver function tests and electrolytes were normal. The CBC (Complete Blood Count) remained unchanged from the previous test. Hormone tests conducted in November led to halving the patient's estrogen dose. Patient underwent an anorectal manometry in the fall of the previous year.    IMAGING:  A CT was ordered by the gastroenterologist in the fall of the previous year.      ROS:  General: -fever, -chills, -fatigue, -weight gain, -weight loss  Eyes: -vision changes, -redness, -discharge  ENT: -ear pain, -nasal congestion, -sore throat, -dry mouth  Cardiovascular: -chest pain, -palpitations, -lower extremity edema  Respiratory: -cough, -shortness of breath  Gastrointestinal: -abdominal pain, -nausea, -vomiting, -diarrhea, +constipation, -blood in stool  Genitourinary: -dysuria, -hematuria, -frequency  Musculoskeletal: -joint pain, -muscle pain  Skin: -rash, -lesion  Neurological: -headache, +dizziness, -numbness, -tingling  Psychiatric: -anxiety, -depression, -sleep difficulty         Past Medical History:   Diagnosis Date    Insomnia, idiopathic     Migraines     Personal history of adenomatous and serrated colon polyps 10/30/2024    PONV (postoperative nausea and vomiting)     Vitamin D deficiency         Past Surgical History:   Procedure Laterality Date    COLONOSCOPY      COLONOSCOPY  10/30/2024    HERNIA REPAIR      March 2018    MOUTH SURGERY      Gum grafting 3x       Family History   Problem Relation Name Age of Onset  "   Heart attack Father      Heart attack Maternal Grandmother      Heart attack Paternal Grandfather      Thyroid disease Mother      Diabetes Mellitus Paternal Grandmother      Breast cancer Paternal Grandmother  75       Social History     Socioeconomic History    Marital status:    Tobacco Use    Smoking status: Never    Smokeless tobacco: Never   Substance and Sexual Activity    Alcohol use: Yes     Comment: socially    Drug use: No    Sexual activity: Yes     Social Drivers of Health     Financial Resource Strain: Low Risk  (12/12/2023)    Overall Financial Resource Strain (CARDIA)     Difficulty of Paying Living Expenses: Not hard at all   Food Insecurity: No Food Insecurity (12/12/2023)    Hunger Vital Sign     Worried About Running Out of Food in the Last Year: Never true     Ran Out of Food in the Last Year: Never true   Transportation Needs: No Transportation Needs (12/12/2023)    PRAPARE - Transportation     Lack of Transportation (Medical): No     Lack of Transportation (Non-Medical): No   Physical Activity: Sufficiently Active (12/12/2023)    Exercise Vital Sign     Days of Exercise per Week: 6 days     Minutes of Exercise per Session: 40 min   Stress: No Stress Concern Present (12/12/2023)    [employer] [place] [employer] - Occupational Stress Questionnaire     Feeling of Stress : Only a little   Housing Stability: Low Risk  (12/12/2023)    Housing Stability Vital Sign     Unable to Pay for Housing in the Last Year: No     Number of Places Lived in the Last Year: 1     Unstable Housing in the Last Year: No       Current Medications[1]    Review of patient's allergies indicates:   Allergen Reactions    Penicillins Anaphylaxis and Rash     augementin       Objective:      Blood pressure 120/70, pulse 67, temperature 98.6 °F (37 °C), height 5' 3" (1.6 m), weight 46.7 kg (103 lb), last menstrual period 07/19/2023, SpO2 99%. Body mass index is 18.25 kg/m².   Physical Exam    General: No " acute distress. Well-developed. Well-nourished.  Eyes: EOMI. Sclerae anicteric.  HENT: Normocephalic. Atraumatic. Nares patent. Moist oral mucosa.  Ears: Bilateral TMs clear. Bilateral EACs clear.  Cardiovascular: Regular rate. Regular rhythm. No murmurs. No rubs. No gallops. Normal S1, S2.  Respiratory: Normal respiratory effort. Clear to auscultation bilaterally. No rales. No rhonchi. No wheezing.  Abdomen: Soft. Non-tender. Non-distended. Normoactive bowel sounds.  Musculoskeletal: No  obvious deformity.  Extremities: No lower extremity edema.  Neurological: Alert & oriented x3. No slurred speech. Normal gait.  Psychiatric: Normal mood. Normal affect. Good insight. Good judgment.  Skin: Warm. Dry. No rash.  Mouth: White coating on tongue. Oral thrush observed.         Assessment:       Assessment & Plan    - Vitamin B12 levels normal; supplementation unnecessary  - Cholesterol levels elevated, likely due to recent poor dietary choices  - HDL cholesterol elevated, outweighing elevated LDL  - BUN creatinine ratio improved, indicating better hydration  - CBC unchanged; B12 supplementation had no impact  - Vitamin D levels slightly low at 29  - Diagnosed oral thrush, likely due to recent use of different mouthwash product  - Reduced blood pressure medication dosage due to improved diet and estrogen changes    ORAL THRUSH:  - Examined the patient's tongue and diagnosed yeast overgrowth.  - Discussed potential causes of oral thrush, including recent use of Listerine and Crest mouthwash, antibiotic use, and imbalance of natural fannie.  - Explained that oral thrush can cause altered taste and increased thirst.  - Prescribed nystatin oral suspension, 1 teaspoon to be swished and swallowed 4 times daily for 10 days.  - Recommend reducing Listerine use to daily or discontinuing temporarily.  - Instructed the patient to report if symptoms persist after completing the prescribed treatment.    HYPERTENSION:  - Noted that the  patient's blood pressure looks great.  - Assessed that the reported dizziness and spinning sensation when getting up from exercise or bending down was likely due to blood pressure going too low with the current medication dose.  - Decreased losartan dosage from 50 mg to 25 mg daily; instructed to take whole pill.  - Advised the patient to monitor for any changes in symptoms and report if dizziness persists.    HYPERLIPIDEMIA:  - Evaluated the patient's cholesterol levels: LDL cholesterol is 122 mg/dL (goal is less than 100 mg/dL), HDL cholesterol is 91 mg/dL (above the target of 50 mg/dL).  - Discussed possible causes for increased cholesterol, including recent poor dietary choices.  - Recommend continuing high fiber diet and lean meats.  - Advised to follow up in 3 months for reassessment of lipid profile.    MACROCYTIC ANEMIA:  - Noted that the patient stopped taking B12 supplements as they weren't affecting the macrocytic anemia.  - Reviewed CBC results showing no change in anemia levels, with B12 levels within normal range.  - Assessed that the anemia is likely the patient's normal state.  - Recommend continued monitoring of CBC and B12 levels at regular intervals.    CONSTIPATION:  - Reviewed patient's history of constipation.  - Noted that the patient underwent colonoscopy, CT, and anorectal manometry for constipation evaluation.  - Acknowledged that the patient made dietary modifications and increased fiber intake.  - Advised to continue with current dietary regimen and report any worsening of symptoms.    VITAMIN D DEFICIENCY:  - Evaluated vitamin D level at 29 ng/mL, slightly below the normal range of  ng/mL.  - Emphasized the importance of vitamin D for bone health and calcium absorption.  - Discussed optimal vitamin D range for women is 50-70 ng/mL.  - Restarted vitamin D supplementation at 5616-6647 IU daily.  - Scheduled to recheck vitamin D levels in 6 months.    HERPES SIMPLEX VIRUS (FEVER  BLISTERS):  - Noted patient's recent outbreak of fever blisters.  - Confirmed that the patient took Valtrex (acyclovir) for treatment of fever blisters.  - Instructed the patient to message via patient portal when needing medication refills for as-needed medications like Valtrex.  - Advised to initiate treatment at the first sign of an outbreak for best results.    ORTHOSTATIC HYPOTENSION:  - Assessed that dizziness and spinning sensa  tion when getting up from exercise or bending down is likely due to blood pressure medication causing low blood pressure.  - Reduced losartan dosage from 50 mg to 25 mg daily to address dizziness.  - Instructed the patient to monitor symptoms and report if dizziness persists or worsens.  - Recommend rising slowly from sitting or lying positions to minimize symptoms.    LABS:  - CBC, vitamin D, and other labs [facility]    FOLLOW UP:  - Follow up in 6 months for lab result review.       Plan:       Angel was seen today for hypertension and follow-up.    Diagnoses and all orders for this visit:    Primary hypertension  -     losartan (COZAAR) 25 MG tablet; Take 1 tablet (25 mg total) by mouth once daily.  -     CBC Auto Differential; Future  -     Comprehensive Metabolic Panel; Future  -     Urinalysis; Future  -     CBC Auto Differential  -     Comprehensive Metabolic Panel  -     Urinalysis    Thrush  -     nystatin (MYCOSTATIN) 100,000 unit/mL suspension; Take 5 mLs (500,000 Units total) by mouth 4 (four) times daily. for 10 days    Macrocytic anemia  Stable  Unchanged with addition of B12    Mild hyperlipidemia  -     Lipid Panel; Future  -     Lipid Panel  Limit red meat, butter, fried foods, cheese, and other foods that have a lot of saturated fat. Consume more: lean meats, fish, fruits, vegetables, whole grains, beans, lentils, and nuts.  Weight loss, and 30-45 min of cardiovascular exercise daily.      Migraine with status migrainosus, not  intractable, unspecified migraine type  Stable with current medication regimen    Herpes labialis  Stable  Improving with valtrex    BMI < 18.5  Healthy diet and exercise encouraged    Vitamin D insufficiency  -     Vitamin D; Future  -     Vitamin D           This note was generated with the assistance of ambient listening technology. Verbal consent was obtained by the patient and accompanying visitor(s) for the recording of patient appointment to facilitate this note. I attest to having reviewed and edited the generated note for accuracy, though some syntax or spelling errors may persist. Please contact the author of this note for any clarification.    Visit today included increased complexity associated with the care of the episodic problem thrush addressed and managing the longitudinal care of the patient due to the serious and/or complex managed problem(s) hypertension, vitamin deficiency, mild hyperlipidemia.          [1]   Current Outpatient Medications   Medication Sig Dispense Refill    estradioL (ESTRACE) 1 MG tablet Take 0.5 mg by mouth once daily.      fluticasone propionate (FLONASE) 50 mcg/actuation nasal spray 1 spray (50 mcg total) by Each Nostril route 2 (two) times daily. 16 g 5    multivitamin capsule Take 1 capsule by mouth once daily.      polyethylene glycol (GLYCOLAX) 17 gram/dose powder DRINK 17 GRAMS DISSOLVED IN WATER 1 TO 4 TIMES DAILY AS NEEDED FOR CONSTIPATION      progesterone (PROMETRIUM) 200 MG capsule       sumatriptan (IMITREX) 100 MG tablet 1 tablet PO at onset of migraine. Repeat 1 tablet PO in 2 hours if not relieved. Max 2 tablets in 24 hours. 15 tablet 7    valACYclovir (VALTREX) 1000 MG tablet Take 2 tablets PO at onset of fever blister. Repeat 2 tablets PO 12 hours later. 30 tablet 2    losartan (COZAAR) 25 MG tablet Take 1 tablet (25 mg total) by mouth once daily. 90 tablet 1    nystatin (MYCOSTATIN) 100,000 unit/mL suspension Take 5 mLs (500,000 Units total) by mouth 4  (four) times daily. for 10 days 200 mL 0     No current facility-administered medications for this visit.

## 2025-04-25 ENCOUNTER — TELEPHONE (OUTPATIENT)
Dept: FAMILY MEDICINE | Facility: CLINIC | Age: 60
End: 2025-04-25
Payer: COMMERCIAL

## 2025-04-25 DIAGNOSIS — H65.03 BILATERAL ACUTE SEROUS OTITIS MEDIA, RECURRENCE NOT SPECIFIED: ICD-10-CM

## 2025-04-25 DIAGNOSIS — J01.00 ACUTE MAXILLARY SINUSITIS, RECURRENCE NOT SPECIFIED: ICD-10-CM

## 2025-04-25 RX ORDER — FLUTICASONE PROPIONATE 50 MCG
1 SPRAY, SUSPENSION (ML) NASAL 2 TIMES DAILY
Qty: 16 G | Refills: 5 | Status: SHIPPED | OUTPATIENT
Start: 2025-04-25

## 2025-04-25 NOTE — TELEPHONE ENCOUNTER
----- Message from Nimco sent at 4/25/2025 10:01 AM CDT -----  Pt called because she has requested a refill for fluticasone propionate on Monday. Pt uses [facility] [address]. Pt stated that this her 3rd try and she would like a call back once it is completed.[phone]

## 2025-04-28 DIAGNOSIS — H65.03 BILATERAL ACUTE SEROUS OTITIS MEDIA, RECURRENCE NOT SPECIFIED: ICD-10-CM

## 2025-04-28 DIAGNOSIS — J01.00 ACUTE MAXILLARY SINUSITIS, RECURRENCE NOT SPECIFIED: ICD-10-CM

## 2025-04-28 RX ORDER — FLUTICASONE PROPIONATE 50 MCG
1 SPRAY, SUSPENSION (ML) NASAL 2 TIMES DAILY
Qty: 16 G | Refills: 5 | Status: SHIPPED | OUTPATIENT
Start: 2025-04-28

## 2025-04-28 NOTE — TELEPHONE ENCOUNTER
----- Message from Maria Guadalupe sent at 4/28/2025  7:24 AM CDT -----  Contact: self  Type: Needs Medical AdviceWho Called:  the patient Pharmacy name and phone #:  [id number] - [place], [place] - [address] [facility] [id number]  [place] [address]Xobzy: [phone] Fax: [phone] Call Back Number: [phone] Information: pt called to say script never made it to the pharmacy, could someone please resend it to [facility], pt is completely out and waiting for over a week for refill. Pt is suffering at night, pt states pharm tried to get a refill on Sunday also, please call

## 2025-06-05 DIAGNOSIS — Z12.31 ENCOUNTER FOR SCREENING MAMMOGRAM FOR MALIGNANT NEOPLASM OF BREAST: Primary | ICD-10-CM

## 2025-06-24 ENCOUNTER — HOSPITAL ENCOUNTER (OUTPATIENT)
Dept: RADIOLOGY | Facility: HOSPITAL | Age: 60
Discharge: HOME | End: 2025-06-24
Attending: OBSTETRICS & GYNECOLOGY
Payer: COMMERCIAL

## 2025-06-24 DIAGNOSIS — Z12.31 ENCOUNTER FOR SCREENING MAMMOGRAM FOR MALIGNANT NEOPLASM OF BREAST: ICD-10-CM

## 2025-06-24 PROCEDURE — 77067 SCR MAMMO BI INCL CAD: CPT | Mod: 26,,, | Performed by: RADIOLOGY

## 2025-06-24 PROCEDURE — 77063 BREAST TOMOSYNTHESIS BI: CPT | Mod: 26,,, | Performed by: RADIOLOGY

## 2025-06-24 PROCEDURE — 77067 SCR MAMMO BI INCL CAD: CPT | Mod: TC,PO

## 2025-08-08 ENCOUNTER — TELEPHONE (OUTPATIENT)
Dept: FAMILY MEDICINE | Facility: CLINIC | Age: 60
End: 2025-08-08
Payer: COMMERCIAL

## 2025-08-08 NOTE — TELEPHONE ENCOUNTER
----- Message from Yolanda sent at 8/8/2025 10:31 AM CDT -----  The patient has an Annual on  08/28/2025.  She wants to know if you can print out her lab orders and she will come  a copy of the order.  She wants to come about 1 today. Please call and let her know if that is ok.  Pt's # [phone]

## 2025-08-20 ENCOUNTER — TELEPHONE (OUTPATIENT)
Dept: FAMILY MEDICINE | Facility: CLINIC | Age: 60
End: 2025-08-20
Payer: COMMERCIAL

## 2025-08-20 DIAGNOSIS — E55.9 VITAMIN D INSUFFICIENCY: ICD-10-CM

## 2025-08-20 DIAGNOSIS — I10 PRIMARY HYPERTENSION: ICD-10-CM

## 2025-08-20 DIAGNOSIS — E78.5 MILD HYPERLIPIDEMIA: ICD-10-CM

## 2025-08-28 ENCOUNTER — OFFICE VISIT (OUTPATIENT)
Dept: FAMILY MEDICINE | Facility: CLINIC | Age: 60
End: 2025-08-28
Payer: COMMERCIAL

## 2025-08-28 VITALS
BODY MASS INDEX: 18.16 KG/M2 | HEART RATE: 80 BPM | TEMPERATURE: 99 F | WEIGHT: 102.5 LBS | SYSTOLIC BLOOD PRESSURE: 120 MMHG | DIASTOLIC BLOOD PRESSURE: 60 MMHG | HEIGHT: 63 IN | OXYGEN SATURATION: 99 %

## 2025-08-28 DIAGNOSIS — E55.9 VITAMIN D INSUFFICIENCY: ICD-10-CM

## 2025-08-28 DIAGNOSIS — N32.81 OVERACTIVE BLADDER: ICD-10-CM

## 2025-08-28 DIAGNOSIS — Z00.00 ANNUAL PHYSICAL EXAM: Primary | ICD-10-CM

## 2025-08-28 DIAGNOSIS — E78.5 MILD HYPERLIPIDEMIA: ICD-10-CM

## 2025-08-28 DIAGNOSIS — I10 PRIMARY HYPERTENSION: ICD-10-CM

## 2025-08-28 DIAGNOSIS — H00.015 HORDEOLUM EXTERNUM OF LEFT LOWER EYELID: ICD-10-CM

## 2025-08-28 PROCEDURE — 99999 PR PBB SHADOW E&M-EST. PATIENT-LVL IV: CPT | Mod: PBBFAC,,, | Performed by: NURSE PRACTITIONER

## 2025-08-28 PROCEDURE — 3078F DIAST BP <80 MM HG: CPT | Mod: CPTII,S$GLB,, | Performed by: NURSE PRACTITIONER

## 2025-08-28 PROCEDURE — 3074F SYST BP LT 130 MM HG: CPT | Mod: CPTII,S$GLB,, | Performed by: NURSE PRACTITIONER

## 2025-08-28 PROCEDURE — 1159F MED LIST DOCD IN RCRD: CPT | Mod: CPTII,S$GLB,, | Performed by: NURSE PRACTITIONER

## 2025-08-28 PROCEDURE — 4010F ACE/ARB THERAPY RXD/TAKEN: CPT | Mod: CPTII,S$GLB,, | Performed by: NURSE PRACTITIONER

## 2025-08-28 PROCEDURE — 3008F BODY MASS INDEX DOCD: CPT | Mod: CPTII,S$GLB,, | Performed by: NURSE PRACTITIONER

## 2025-08-28 PROCEDURE — 1160F RVW MEDS BY RX/DR IN RCRD: CPT | Mod: CPTII,S$GLB,, | Performed by: NURSE PRACTITIONER

## 2025-08-28 PROCEDURE — 99396 PREV VISIT EST AGE 40-64: CPT | Mod: S$GLB,,, | Performed by: NURSE PRACTITIONER

## 2025-08-28 RX ORDER — VIT C/E/ZN/COPPR/LUTEIN/ZEAXAN 250MG-90MG
CAPSULE ORAL
COMMUNITY

## 2025-08-28 RX ORDER — MIRABEGRON 25 MG/1
25 TABLET, FILM COATED, EXTENDED RELEASE ORAL DAILY
Qty: 30 TABLET | Refills: 5 | Status: SHIPPED | OUTPATIENT
Start: 2025-08-28 | End: 2026-08-28

## 2025-08-28 RX ORDER — ERYTHROMYCIN 5 MG/G
OINTMENT OPHTHALMIC EVERY 8 HOURS
Qty: 3.5 G | Refills: 0 | Status: SHIPPED | OUTPATIENT
Start: 2025-08-28

## 2025-08-28 RX ORDER — LOSARTAN POTASSIUM 25 MG/1
25 TABLET ORAL DAILY
Qty: 90 TABLET | Refills: 1 | Status: SHIPPED | OUTPATIENT
Start: 2025-08-28

## 2025-09-03 ENCOUNTER — PATIENT MESSAGE (OUTPATIENT)
Dept: FAMILY MEDICINE | Facility: CLINIC | Age: 60
End: 2025-09-03
Payer: COMMERCIAL

## (undated) DEVICE — NDL SAFETY 21G X 1 1/2 ECLPSE

## (undated) DEVICE — SUT 3-0 VICRYL / SH (J416)

## (undated) DEVICE — DRESSING SPONGE 16PLY 4X4 NS

## (undated) DEVICE — GOWN X-LG STERILE BACK

## (undated) DEVICE — GAUZE SPONGE PEANUT STRL

## (undated) DEVICE — SUT ETHIBOND EXCEL 0 MO6 18

## (undated) DEVICE — DRESSING TRANS 4X4 TEGADERM

## (undated) DEVICE — GLOVE SURG ULTRA TOUCH 7.5

## (undated) DEVICE — DRAPE INCISE IOBAN 2 23X17IN

## (undated) DEVICE — COTTON BALLS 1IN

## (undated) DEVICE — SUT MONOCRYL 4-0 PS-2

## (undated) DEVICE — GAUZE SPONGE BULKEE 6X6.75IN

## (undated) DEVICE — ELECTRODE REM PLYHSV RETURN 9

## (undated) DEVICE — LINER SUCTION 3000CC

## (undated) DEVICE — CLOSURE SKIN STERI STRIP 1/2X4

## (undated) DEVICE — STRAP OR TABLE 5IN X 72IN

## (undated) DEVICE — DRAPE MINOR PROCEDURE

## (undated) DEVICE — APPLICATOR CHLORAPREP ORN 26ML

## (undated) DEVICE — SEE MEDLINE ITEM 152622

## (undated) DEVICE — SLEEVE SCD EXPRESS CALF MEDIUM

## (undated) DEVICE — PACK CUSTOM UNIV BASIN SLI

## (undated) DEVICE — ELECTRODE BLADE INSULATED 1 IN

## (undated) DEVICE — SYR 10CC LUER LOCK

## (undated) DEVICE — SOL 9P NACL IRR PIC IL